# Patient Record
Sex: FEMALE | Race: WHITE
[De-identification: names, ages, dates, MRNs, and addresses within clinical notes are randomized per-mention and may not be internally consistent; named-entity substitution may affect disease eponyms.]

---

## 2024-05-02 ENCOUNTER — APPOINTMENT (OUTPATIENT)
Dept: FAMILY MEDICINE | Facility: CLINIC | Age: 29
End: 2024-05-02
Payer: COMMERCIAL

## 2024-05-02 ENCOUNTER — NON-APPOINTMENT (OUTPATIENT)
Age: 29
End: 2024-05-02

## 2024-05-02 VITALS
OXYGEN SATURATION: 98 % | DIASTOLIC BLOOD PRESSURE: 67 MMHG | HEIGHT: 69 IN | HEART RATE: 64 BPM | TEMPERATURE: 98.9 F | WEIGHT: 147 LBS | RESPIRATION RATE: 14 BRPM | SYSTOLIC BLOOD PRESSURE: 100 MMHG | BODY MASS INDEX: 21.77 KG/M2

## 2024-05-02 DIAGNOSIS — Z82.61 FAMILY HISTORY OF ARTHRITIS: ICD-10-CM

## 2024-05-02 DIAGNOSIS — Z13.1 ENCOUNTER FOR SCREENING FOR DIABETES MELLITUS: ICD-10-CM

## 2024-05-02 DIAGNOSIS — Z13.29 ENCOUNTER FOR SCREENING FOR OTHER SUSPECTED ENDOCRINE DISORDER: ICD-10-CM

## 2024-05-02 DIAGNOSIS — F32.A DEPRESSION, UNSPECIFIED: ICD-10-CM

## 2024-05-02 DIAGNOSIS — A60.00 HERPESVIRAL INFECTION OF UROGENITAL SYSTEM, UNSPECIFIED: ICD-10-CM

## 2024-05-02 DIAGNOSIS — J45.20 MILD INTERMITTENT ASTHMA, UNCOMPLICATED: ICD-10-CM

## 2024-05-02 DIAGNOSIS — F98.8 OTHER SPECIFIED BEHAVIORAL AND EMOTIONAL DISORDERS WITH ONSET USUALLY OCCURRING IN CHILDHOOD AND ADOLESCENCE: ICD-10-CM

## 2024-05-02 DIAGNOSIS — L40.9 PSORIASIS, UNSPECIFIED: ICD-10-CM

## 2024-05-02 DIAGNOSIS — Z87.891 PERSONAL HISTORY OF NICOTINE DEPENDENCE: ICD-10-CM

## 2024-05-02 DIAGNOSIS — Z82.0 FAMILY HISTORY OF EPILEPSY AND OTHER DISEASES OF THE NERVOUS SYSTEM: ICD-10-CM

## 2024-05-02 DIAGNOSIS — J30.2 OTHER SEASONAL ALLERGIC RHINITIS: ICD-10-CM

## 2024-05-02 DIAGNOSIS — N63.0 UNSPECIFIED LUMP IN UNSPECIFIED BREAST: ICD-10-CM

## 2024-05-02 DIAGNOSIS — Z00.00 ENCOUNTER FOR GENERAL ADULT MEDICAL EXAMINATION W/OUT ABNORMAL FINDINGS: ICD-10-CM

## 2024-05-02 DIAGNOSIS — Z78.9 OTHER SPECIFIED HEALTH STATUS: ICD-10-CM

## 2024-05-02 PROCEDURE — 99385 PREV VISIT NEW AGE 18-39: CPT

## 2024-05-02 PROCEDURE — 96127 BRIEF EMOTIONAL/BEHAV ASSMT: CPT

## 2024-05-02 RX ORDER — ALBUTEROL SULFATE 90 UG/1
108 (90 BASE) INHALANT RESPIRATORY (INHALATION)
Qty: 1 | Refills: 3 | Status: ACTIVE | COMMUNITY
Start: 2024-05-02 | End: 1900-01-01

## 2024-05-02 RX ORDER — FLUTICASONE PROPIONATE 50 UG/1
50 SPRAY, METERED NASAL
Qty: 1 | Refills: 0 | Status: ACTIVE | COMMUNITY
Start: 2024-05-02 | End: 1900-01-01

## 2024-05-02 RX ORDER — CETIRIZINE HYDROCHLORIDE 10 MG/1
10 TABLET, FILM COATED ORAL DAILY
Qty: 30 | Refills: 3 | Status: ACTIVE | COMMUNITY
Start: 2024-05-02 | End: 1900-01-01

## 2024-05-02 RX ORDER — VALACYCLOVIR 500 MG/1
500 TABLET, FILM COATED ORAL TWICE DAILY
Qty: 6 | Refills: 3 | Status: ACTIVE | COMMUNITY
Start: 2024-05-02 | End: 1900-01-01

## 2024-05-02 RX ORDER — AZELASTINE HYDROCHLORIDE 137 UG/1
137 SPRAY, METERED NASAL
Qty: 1 | Refills: 3 | Status: ACTIVE | COMMUNITY
Start: 2024-05-02 | End: 1900-01-01

## 2024-05-02 NOTE — HEALTH RISK ASSESSMENT
[Monthly or less (1 pt)] : Monthly or less (1 point) [1 or 2 (0 pts)] : 1 or 2 (0 points) [Never (0 pts)] : Never (0 points) [Yes] : In the past 12 months have you used drugs other than those required for medical reasons? Yes [Several Days (1)] : 5.) Poor appetite or overeating? Several days [Nearly Every Day (3)] : 7.) Trouble concentrating on things, such as reading a newspaper or watching television? Nearly every day [Not at All (0)] : 9.) Thoughts that you would be off dead or of hurting yourself in some way? Not at all [Mild] : Severity of Depression is Mild [Somewhat Difficult] : How difficult have these problems made it for you to do your work, take care of things at home, or get along with people? Somewhat difficult [PHQ-9 Positive] : PHQ-9 Positive [I have developed a follow-up plan documented below in the note.] : I have developed a follow-up plan documented below in the note. [Patient reported PAP Smear was normal] : Patient reported PAP Smear was normal [HIV Test offered] : HIV Test offered [Hepatitis C test offered] : Hepatitis C test offered [Former] : Former [0-4] : 0-4 [< 15 Years] : < 15 Years [Audit-CScore] : 1 [de-identified] : mushrooms [[id number]] : 7 [PapSmearDate] : 05/22 [de-identified] : 1 cig/day for 3 years, quit 2023

## 2024-05-02 NOTE — REVIEW OF SYSTEMS
[Sore Throat] : no sore throat [Suicidal] : not suicidal [Anxiety] : no anxiety [Depression] : depression [Negative] : Genitourinary [FreeTextEntry4] : itchy throat and sometimes coughs up tonsil stones

## 2024-05-02 NOTE — HISTORY OF PRESENT ILLNESS
[FreeTextEntry1] : est care and get CPE [de-identified] : Pt comes in to est care and get CPE. Pt moved here from [place] 1 month ago.  Has only had 1 HPV vaccine in past, would be interested in completing series. Hasn't had Pap in years. Had breast pain in past, former physician found breast lump for which pt was getting breast US every 6 months. She stopped going years ago. Has had nipple discharge for years, no recent change, no blood in it.   Asthma: uses albuterol inhaler before exercise. Hasn't had any exacerbations anytime recently.  Pt has hx of major depressive disorder and ADD. In the past she was on ritalin and xanax, she stopped it at the time bc her psychiatrist passed away and then it was hard to get refills. She was then restarted on ritalin, that made symptoms worse so she stopped taking it about 1 year ago. Not currently following with therapist or psychiatrist, but would be interested in following with therapist.

## 2024-05-02 NOTE — ASSESSMENT
[FreeTextEntry1] : # HM f/u AV labs Advised patient to look into insurance coverage of gardasil vaccine since she is past the upper limit age range  # STD screen f/u labs  # Mass of breast, overdue for cervical ca screen Referred to GYN  # Genital herpes Renewed valtrex script  # Mild asthma Sent in albuterol inhaler  # Mild depression/ADHD Pt was on ritalin and xanax in past Not currently on medications, but last time she took ritalin about 1 year ago she felt worse so stopped Referred to psychiatry Dr. Webber  # Scalp psoriasis Following w/ dermatologist  f/u in 1 year or PRN

## 2024-05-02 NOTE — PHYSICAL EXAM
[Normal TMs] : both tympanic membranes were normal [Supple] : supple [No Edema] : there was no peripheral edema [No Extremity Clubbing/Cyanosis] : no extremity clubbing/cyanosis [Soft] : abdomen soft [Non Tender] : non-tender [Non-distended] : non-distended [No Masses] : no abdominal mass palpated [Normal Supraclavicular Nodes] : no supraclavicular lymphadenopathy [Normal Gait] : normal gait [Speech Grossly Normal] : speech grossly normal [Normal Mood] : the mood was normal [Normal] : affect was normal and insight and judgment were intact [de-identified] : L palatal arch with 2 adjacent nodules vs blisters [de-identified] : L palpable 1 cm LN in cervical chain, non-tender

## 2024-05-08 ENCOUNTER — LABORATORY RESULT (OUTPATIENT)
Age: 29
End: 2024-05-08

## 2024-05-08 LAB
ALBUMIN SERPL ELPH-MCNC: 4.3 G/DL
ALP BLD-CCNC: 52 U/L
ALT SERPL-CCNC: 13 U/L
ANION GAP SERPL CALC-SCNC: 10 MMOL/L
AST SERPL-CCNC: 17 U/L
BILIRUB SERPL-MCNC: 0.6 MG/DL
BUN SERPL-MCNC: 9 MG/DL
CALCIUM SERPL-MCNC: 9.2 MG/DL
CHLORIDE SERPL-SCNC: 104 MMOL/L
CHOLEST SERPL-MCNC: 147 MG/DL
CO2 SERPL-SCNC: 27 MMOL/L
CREAT SERPL-MCNC: 0.69 MG/DL
EGFR: 121 ML/MIN/1.73M2
GLUCOSE SERPL-MCNC: 88 MG/DL
HDLC SERPL-MCNC: 83 MG/DL
LDLC SERPL CALC-MCNC: 56 MG/DL
NONHDLC SERPL-MCNC: 64 MG/DL
POTASSIUM SERPL-SCNC: 4.2 MMOL/L
PROT SERPL-MCNC: 6.4 G/DL
SODIUM SERPL-SCNC: 141 MMOL/L
TRIGL SERPL-MCNC: 31 MG/DL
TSH SERPL-ACNC: 1.92 UIU/ML

## 2024-05-09 LAB
C TRACH RRNA SPEC QL NAA+PROBE: NOT DETECTED
ESTIMATED AVERAGE GLUCOSE: 100 MG/DL
HAV IGM SER QL: NONREACTIVE
HBA1C MFR BLD HPLC: 5.1 %
HBV CORE IGM SER QL: NONREACTIVE
HBV SURFACE AG SER QL: NONREACTIVE
HCV AB SER QL: NONREACTIVE
HCV S/CO RATIO: 0.1 S/CO
HIV1+2 AB SPEC QL IA.RAPID: NONREACTIVE
N GONORRHOEA RRNA SPEC QL NAA+PROBE: NOT DETECTED
SOURCE AMPLIFICATION: NORMAL
T PALLIDUM AB SER QL IA: NEGATIVE

## 2024-05-10 ENCOUNTER — NON-APPOINTMENT (OUTPATIENT)
Age: 29
End: 2024-05-10

## 2024-05-13 ENCOUNTER — APPOINTMENT (OUTPATIENT)
Dept: OBGYN | Facility: CLINIC | Age: 29
End: 2024-05-13
Payer: COMMERCIAL

## 2024-05-13 VITALS
WEIGHT: 150 LBS | HEART RATE: 62 BPM | OXYGEN SATURATION: 96 % | BODY MASS INDEX: 22.22 KG/M2 | TEMPERATURE: 98.2 F | SYSTOLIC BLOOD PRESSURE: 90 MMHG | DIASTOLIC BLOOD PRESSURE: 70 MMHG | HEIGHT: 69 IN

## 2024-05-13 DIAGNOSIS — Z01.419 ENCOUNTER FOR GYNECOLOGICAL EXAMINATION (GENERAL) (ROUTINE) W/OUT ABNORMAL FINDINGS: ICD-10-CM

## 2024-05-13 DIAGNOSIS — Z86.19 PERSONAL HISTORY OF OTHER INFECTIOUS AND PARASITIC DISEASES: ICD-10-CM

## 2024-05-13 DIAGNOSIS — Z12.4 ENCOUNTER FOR SCREENING FOR MALIGNANT NEOPLASM OF CERVIX: ICD-10-CM

## 2024-05-13 DIAGNOSIS — Z78.9 OTHER SPECIFIED HEALTH STATUS: ICD-10-CM

## 2024-05-13 DIAGNOSIS — Z83.49 FAMILY HISTORY OF OTHER ENDOCRINE, NUTRITIONAL AND METABOLIC DISEASES: ICD-10-CM

## 2024-05-13 DIAGNOSIS — Z23 ENCOUNTER FOR IMMUNIZATION: ICD-10-CM

## 2024-05-13 DIAGNOSIS — N93.9 ABNORMAL UTERINE AND VAGINAL BLEEDING, UNSPECIFIED: ICD-10-CM

## 2024-05-13 DIAGNOSIS — Z11.3 ENCOUNTER FOR SCREENING FOR INFECTIONS WITH A PREDOMINANTLY SEXUAL MODE OF TRANSMISSION: ICD-10-CM

## 2024-05-13 PROCEDURE — 81025 URINE PREGNANCY TEST: CPT

## 2024-05-13 PROCEDURE — 99385 PREV VISIT NEW AGE 18-39: CPT

## 2024-05-13 PROCEDURE — 99213 OFFICE O/P EST LOW 20 MIN: CPT | Mod: 25

## 2024-05-13 NOTE — HISTORY OF PRESENT ILLNESS
[Normal Amount/Duration] :  normal amount and duration [Menarche Age: ____] : age at menarche was [unfilled] [No] : Patient does not have concerns regarding sex [Currently Active] : currently active [FreeTextEntry1] : 30 yo  LMP 24 presents for annual and  evaluation of an episode of abnormal vaginal bleeding

## 2024-05-13 NOTE — COUNSELING
[Nutrition/ Exercise/ Weight Management] : nutrition, exercise, weight management [Vitamins/Supplements] : vitamins/supplements [Breast Self Exam] : breast self exam [Bladder Hygiene] : bladder hygiene [Contraception/ Emergency Contraception/ Safe Sexual Practices] : contraception, emergency contraception, safe sexual practices [HPV Vaccine] : HPV Vaccine

## 2024-05-13 NOTE — PHYSICAL EXAM
[Chaperone Present] : A chaperone was present in the examining room during all aspects of the physical examination [Appropriately responsive] : appropriately responsive [Alert] : alert [No Lymphadenopathy] : no lymphadenopathy [Soft] : soft [Non-tender] : non-tender [Non-distended] : non-distended [Oriented x3] : oriented x3 [Examination Of The Breasts] : a normal appearance [No Discharge] : no discharge [No Masses] : no breast masses were palpable [Labia Majora] : normal [Labia Minora] : normal [No Bleeding] : There was no active vaginal bleeding [Normal] : normal [Uterine Adnexae] : normal

## 2024-05-15 LAB
C TRACH RRNA SPEC QL NAA+PROBE: NOT DETECTED
CANDIDA VAG CYTO: NOT DETECTED
G VAGINALIS+PREV SP MTYP VAG QL MICRO: NOT DETECTED
HPV 16 E6+E7 MRNA CVX QL NAA+PROBE: NOT DETECTED
HPV18+45 E6+E7 MRNA CVX QL NAA+PROBE: NOT DETECTED
N GONORRHOEA RRNA SPEC QL NAA+PROBE: NOT DETECTED
SOURCE AMPLIFICATION: NORMAL
T VAGINALIS VAG QL WET PREP: NOT DETECTED

## 2024-05-20 LAB
APPEARANCE: CLEAR
BASOPHILS # BLD AUTO: 0.02 K/UL
BASOPHILS NFR BLD AUTO: 0.5 %
BILIRUBIN URINE: NEGATIVE
BLOOD URINE: ABNORMAL
COLOR: YELLOW
CYTOLOGY CVX/VAG DOC THIN PREP: NORMAL
EOSINOPHIL # BLD AUTO: 0.1 K/UL
EOSINOPHIL NFR BLD AUTO: 2.7 %
GLUCOSE QUALITATIVE U: NEGATIVE MG/DL
HCT VFR BLD CALC: 38.5 %
HGB BLD-MCNC: 12.9 G/DL
IMM GRANULOCYTES NFR BLD AUTO: 0.3 %
KETONES URINE: NEGATIVE MG/DL
LEUKOCYTE ESTERASE URINE: NEGATIVE
LYMPHOCYTES # BLD AUTO: 2.03 K/UL
LYMPHOCYTES NFR BLD AUTO: 54.6 %
MAN DIFF?: NORMAL
MCHC RBC-ENTMCNC: 29.5 PG
MCHC RBC-ENTMCNC: 33.5 GM/DL
MCV RBC AUTO: 87.9 FL
MONOCYTES # BLD AUTO: 0.36 K/UL
MONOCYTES NFR BLD AUTO: 9.7 %
NEUTROPHILS # BLD AUTO: 1.2 K/UL
NEUTROPHILS NFR BLD AUTO: 32.2 %
NITRITE URINE: NEGATIVE
PH URINE: 6
PLATELET # BLD AUTO: 304 K/UL
PROTEIN URINE: NEGATIVE MG/DL
RBC # BLD: 4.38 M/UL
RBC # FLD: 12.4 %
SPECIFIC GRAVITY URINE: 1.02
UROBILINOGEN URINE: 0.2 MG/DL
WBC # FLD AUTO: 3.72 K/UL

## 2024-05-21 LAB — HPV HIGH+LOW RISK DNA PNL CVX: NOT DETECTED

## 2024-06-13 ENCOUNTER — APPOINTMENT (OUTPATIENT)
Dept: FAMILY MEDICINE | Facility: CLINIC | Age: 29
End: 2024-06-13
Payer: COMMERCIAL

## 2024-06-13 VITALS
RESPIRATION RATE: 14 BRPM | TEMPERATURE: 97.8 F | OXYGEN SATURATION: 98 % | WEIGHT: 144 LBS | BODY MASS INDEX: 21.33 KG/M2 | SYSTOLIC BLOOD PRESSURE: 90 MMHG | DIASTOLIC BLOOD PRESSURE: 70 MMHG | HEIGHT: 69 IN | HEART RATE: 76 BPM

## 2024-06-13 DIAGNOSIS — J02.9 ACUTE PHARYNGITIS, UNSPECIFIED: ICD-10-CM

## 2024-06-13 PROCEDURE — 99213 OFFICE O/P EST LOW 20 MIN: CPT

## 2024-06-13 NOTE — PHYSICAL EXAM
[Normal Sclera/Conjunctiva] : normal sclera/conjunctiva [Normal Outer Ear/Nose] : the outer ears and nose were normal in appearance [Normal TMs] : both tympanic membranes were normal [No Lymphadenopathy] : no lymphadenopathy [Supple] : supple [Normal] : no respiratory distress, lungs were clear to auscultation bilaterally and no accessory muscle use [No Extremity Clubbing/Cyanosis] : no extremity clubbing/cyanosis [Normal Gait] : normal gait [Normal Affect] : the affect was normal [Alert and Oriented x3] : oriented to person, place, and time [Normal Insight/Judgement] : insight and judgment were intact [de-identified] : mildly erythematous oropharynx, no exudates. L ear canal slightly erythematous

## 2024-06-13 NOTE — REVIEW OF SYSTEMS
[Fever] : no fever [Chills] : no chills [Fatigue] : no fatigue [Earache] : no earache [Nasal Discharge] : nasal discharge [Sore Throat] : sore throat [Negative] : Respiratory

## 2024-06-13 NOTE — ASSESSMENT
[FreeTextEntry1] : # Viral pharyngitis f/u COVID, flu, RSV Symptomatic management only with dayquil, throat lozenges, etc  f/u PRN

## 2024-06-13 NOTE — HISTORY OF PRESENT ILLNESS
[FreeTextEntry8] : Pt comes in for sick visit. Woke up today with sore throat, runny nose. She touched a baby duck 2 weeks ago. Has also been [place] once weekly without a mask. No known sick contacts. No fever/chills, SOB, cough, earache. Took dayquil today, helped with symptoms.

## 2024-06-14 LAB
INFLUENZA A RESULT: NOT DETECTED
INFLUENZA B RESULT: NOT DETECTED
RESP SYN VIRUS RESULT: NOT DETECTED
SARS-COV-2 RESULT: NOT DETECTED

## 2024-08-19 ENCOUNTER — NON-APPOINTMENT (OUTPATIENT)
Age: 29
End: 2024-08-19

## 2024-08-21 ENCOUNTER — APPOINTMENT (OUTPATIENT)
Dept: OBGYN | Facility: CLINIC | Age: 29
End: 2024-08-21
Payer: COMMERCIAL

## 2024-08-21 VITALS
BODY MASS INDEX: 21.18 KG/M2 | WEIGHT: 143 LBS | DIASTOLIC BLOOD PRESSURE: 73 MMHG | SYSTOLIC BLOOD PRESSURE: 111 MMHG | HEIGHT: 69 IN | HEART RATE: 82 BPM

## 2024-08-21 DIAGNOSIS — N91.2 AMENORRHEA, UNSPECIFIED: ICD-10-CM

## 2024-08-21 LAB — HCG UR QL: POSITIVE

## 2024-08-21 PROCEDURE — 76830 TRANSVAGINAL US NON-OB: CPT

## 2024-08-21 PROCEDURE — 81025 URINE PREGNANCY TEST: CPT

## 2024-08-21 PROCEDURE — 99214 OFFICE O/P EST MOD 30 MIN: CPT | Mod: 25

## 2024-08-21 NOTE — PHYSICAL EXAM
[TextEntry] : Transvaginal ultrasound performed by me at bedside reveals a segovia viable intrauterine pregnancy with crown-rump length of 1.77 cm consistent with 8 weeks 2 days.  Subchorionic hematoma known superiorly above gestational sac not involving gestational sac measuring 1.56 x 1.03 cm.  Normal adnexa bilaterally, no free fluid.

## 2024-08-21 NOTE — DISCUSSION/SUMMARY
[FreeTextEntry1] : 29-year-old G1, P0 at 8 weeks 2 days based on my bedside ultrasound, unclear LMP, RED[facility] March 31, 2025.   Amenorrhea: Oriented to  and call coverage, booklet given, Rx blood work, reviewed NIPT and genetic testing.  Reviewed will need official sonogram for dating since patient is unsure of her LMP.  Reviewed NT and NIPT in 4 weeks  Subchorionic hematoma: Patient counseled, bleeding precautions, pelvic rest

## 2024-08-21 NOTE — HISTORY OF PRESENT ILLNESS
[FreeTextEntry1] : This is a 29-year-old G1, P0 with LMP of May 8 who presents for initial visit, pregnancy confirmation  She is here with her partner Phil.  She states that her flow lorene states she is 8 weeks pregnant however LMP of May 8 would make her 15 weeks 0 days.  Patient denies vaginal bleeding, is feeling well   - History of Present Illness (HPI) : Leeanna is unsure of her exact gestational age due to conflicting information from her LMP date and a pregnancy lorene. She denies any bleeding, cramping, or other concerning symptoms. Past GYN: History of genital herpes, last outbreak last year.  Denies history of abnormal Paps/gonorrhea/chlamydia - Past Medical History : No significant past medical history reported. - Past Surgical History : No previous surgeries reported. - Family History : Father with rheumatoid arthritis. Mother with hypothyroidism. - Social History : Works as a  for a . No current smoking, vaping, or substance use. Previous history of vaping and marijuana use, but has quit.  Had a full Pap and annual and STD panel with Dr. Simmons.  STD panel was negative. [PapSmeardate] : 5/2024 [TextBox_31] : neg/neg [PGxTotal] : 1

## 2024-09-03 ENCOUNTER — TRANSCRIPTION ENCOUNTER (OUTPATIENT)
Age: 29
End: 2024-09-03

## 2024-09-04 ENCOUNTER — ASOB RESULT (OUTPATIENT)
Age: 29
End: 2024-09-04

## 2024-09-04 ENCOUNTER — APPOINTMENT (OUTPATIENT)
Dept: ANTEPARTUM | Facility: CLINIC | Age: 29
End: 2024-09-04
Payer: COMMERCIAL

## 2024-09-04 PROCEDURE — 76817 TRANSVAGINAL US OBSTETRIC: CPT

## 2024-09-04 PROCEDURE — 76801 OB US < 14 WKS SINGLE FETUS: CPT

## 2024-09-09 ENCOUNTER — APPOINTMENT (OUTPATIENT)
Dept: OBGYN | Facility: CLINIC | Age: 29
End: 2024-09-09
Payer: COMMERCIAL

## 2024-09-09 VITALS
BODY MASS INDEX: 22.66 KG/M2 | SYSTOLIC BLOOD PRESSURE: 100 MMHG | WEIGHT: 153 LBS | HEIGHT: 69 IN | DIASTOLIC BLOOD PRESSURE: 60 MMHG

## 2024-09-09 DIAGNOSIS — Z34.90 ENCOUNTER FOR SUPERVISION OF NORMAL PREGNANCY, UNSPECIFIED, UNSPECIFIED TRIMESTER: ICD-10-CM

## 2024-09-09 LAB
BILIRUB UR QL STRIP: NORMAL
CLARITY UR: CLEAR
COLLECTION METHOD: NORMAL
GLUCOSE UR-MCNC: NORMAL
HCG UR QL: 0.2 EU/DL
HGB UR QL STRIP.AUTO: NORMAL
KETONES UR-MCNC: NORMAL
LEUKOCYTE ESTERASE UR QL STRIP: NORMAL
NITRITE UR QL STRIP: NORMAL
PH UR STRIP: 5.5
PROT UR STRIP-MCNC: NORMAL
SP GR UR STRIP: >=1.03

## 2024-09-09 PROCEDURE — 81003 URINALYSIS AUTO W/O SCOPE: CPT | Mod: NC,QW

## 2024-09-09 PROCEDURE — 0501F PRENATAL FLOW SHEET: CPT

## 2024-09-09 PROCEDURE — 36415 COLL VENOUS BLD VENIPUNCTURE: CPT

## 2024-09-19 ENCOUNTER — NON-APPOINTMENT (OUTPATIENT)
Age: 29
End: 2024-09-19

## 2024-09-20 ENCOUNTER — APPOINTMENT (OUTPATIENT)
Dept: ANTEPARTUM | Facility: CLINIC | Age: 29
End: 2024-09-20
Payer: COMMERCIAL

## 2024-09-20 ENCOUNTER — APPOINTMENT (OUTPATIENT)
Dept: OBGYN | Facility: CLINIC | Age: 29
End: 2024-09-20
Payer: COMMERCIAL

## 2024-09-20 ENCOUNTER — ASOB RESULT (OUTPATIENT)
Age: 29
End: 2024-09-20

## 2024-09-20 VITALS
DIASTOLIC BLOOD PRESSURE: 72 MMHG | OXYGEN SATURATION: 100 % | BODY MASS INDEX: 22.66 KG/M2 | HEIGHT: 69 IN | SYSTOLIC BLOOD PRESSURE: 101 MMHG | WEIGHT: 153 LBS | HEART RATE: 72 BPM

## 2024-09-20 DIAGNOSIS — Z34.91 ENCOUNTER FOR SUPERVISION OF NORMAL PREGNANCY, UNSPECIFIED, FIRST TRIMESTER: ICD-10-CM

## 2024-09-20 PROCEDURE — 76813 OB US NUCHAL MEAS 1 GEST: CPT

## 2024-09-20 PROCEDURE — 0502F SUBSEQUENT PRENATAL CARE: CPT

## 2024-09-20 PROCEDURE — 81003 URINALYSIS AUTO W/O SCOPE: CPT | Mod: NC,QW

## 2024-09-20 PROCEDURE — 90471 IMMUNIZATION ADMIN: CPT

## 2024-09-20 PROCEDURE — 90656 IIV3 VACC NO PRSV 0.5 ML IM: CPT

## 2024-09-20 PROCEDURE — 36415 COLL VENOUS BLD VENIPUNCTURE: CPT

## 2024-09-22 LAB
BILIRUB UR QL STRIP: NEGATIVE
CLARITY UR: CLEAR
COLLECTION METHOD: NORMAL
GLUCOSE UR-MCNC: NEGATIVE
HCG UR QL: 0.2 EU/DL
HGB UR QL STRIP.AUTO: NEGATIVE
KETONES UR-MCNC: NEGATIVE
LEUKOCYTE ESTERASE UR QL STRIP: NEGATIVE
NITRITE UR QL STRIP: NEGATIVE
PH UR STRIP: 6
PROT UR STRIP-MCNC: NEGATIVE
SP GR UR STRIP: 1.01

## 2024-10-02 ENCOUNTER — APPOINTMENT (OUTPATIENT)
Dept: ULTRASOUND IMAGING | Facility: CLINIC | Age: 29
End: 2024-10-02
Payer: COMMERCIAL

## 2024-10-02 ENCOUNTER — RESULT REVIEW (OUTPATIENT)
Age: 29
End: 2024-10-02

## 2024-10-02 ENCOUNTER — OUTPATIENT (OUTPATIENT)
Dept: OUTPATIENT SERVICES | Facility: HOSPITAL | Age: 29
LOS: 1 days | End: 2024-10-02
Payer: COMMERCIAL

## 2024-10-02 DIAGNOSIS — N63.0 UNSPECIFIED LUMP IN UNSPECIFIED BREAST: ICD-10-CM

## 2024-10-02 PROCEDURE — 76641 ULTRASOUND BREAST COMPLETE: CPT | Mod: 26,50

## 2024-10-02 PROCEDURE — 76641 ULTRASOUND BREAST COMPLETE: CPT

## 2024-10-03 ENCOUNTER — TRANSCRIPTION ENCOUNTER (OUTPATIENT)
Age: 29
End: 2024-10-03

## 2024-10-15 ENCOUNTER — APPOINTMENT (OUTPATIENT)
Dept: BREAST CENTER | Facility: CLINIC | Age: 29
End: 2024-10-15
Payer: COMMERCIAL

## 2024-10-15 VITALS
WEIGHT: 153 LBS | DIASTOLIC BLOOD PRESSURE: 65 MMHG | HEIGHT: 69 IN | HEART RATE: 88 BPM | BODY MASS INDEX: 22.66 KG/M2 | SYSTOLIC BLOOD PRESSURE: 99 MMHG

## 2024-10-15 DIAGNOSIS — Z80.9 FAMILY HISTORY OF MALIGNANT NEOPLASM, UNSPECIFIED: ICD-10-CM

## 2024-10-15 DIAGNOSIS — N64.52 NIPPLE DISCHARGE: ICD-10-CM

## 2024-10-15 DIAGNOSIS — N63.20 UNSPECIFIED LUMP IN THE LEFT BREAST, UNSPECIFIED QUADRANT: ICD-10-CM

## 2024-10-15 DIAGNOSIS — N64.4 MASTODYNIA: ICD-10-CM

## 2024-10-15 DIAGNOSIS — R92.8 OTHER ABNORMAL AND INCONCLUSIVE FINDINGS ON DIAGNOSTIC IMAGING OF BREAST: ICD-10-CM

## 2024-10-15 PROCEDURE — 99204 OFFICE O/P NEW MOD 45 MIN: CPT

## 2024-10-15 RX ORDER — MAGNESIUM OXIDE/MAG AA CHELATE 300 MG
CAPSULE ORAL
Refills: 0 | Status: ACTIVE | COMMUNITY

## 2024-10-15 RX ORDER — CALCIUM CARBONATE/VITAMIN D3 600 MG-10
TABLET ORAL
Refills: 0 | Status: ACTIVE | COMMUNITY

## 2024-10-15 RX ORDER — FOLIC ACID/MULTIVIT,IRON,MINER 0.4MG-18MG
TABLET ORAL
Refills: 0 | Status: ACTIVE | COMMUNITY

## 2024-10-17 ENCOUNTER — RESULT REVIEW (OUTPATIENT)
Age: 29
End: 2024-10-17

## 2024-10-17 ENCOUNTER — APPOINTMENT (OUTPATIENT)
Dept: ULTRASOUND IMAGING | Facility: CLINIC | Age: 29
End: 2024-10-17
Payer: COMMERCIAL

## 2024-10-17 ENCOUNTER — OUTPATIENT (OUTPATIENT)
Dept: OUTPATIENT SERVICES | Facility: HOSPITAL | Age: 29
LOS: 1 days | End: 2024-10-17
Payer: COMMERCIAL

## 2024-10-17 DIAGNOSIS — N63.20 UNSPECIFIED LUMP IN THE LEFT BREAST, UNSPECIFIED QUADRANT: ICD-10-CM

## 2024-10-17 DIAGNOSIS — Z00.8 ENCOUNTER FOR OTHER GENERAL EXAMINATION: ICD-10-CM

## 2024-10-17 DIAGNOSIS — R92.8 OTHER ABNORMAL AND INCONCLUSIVE FINDINGS ON DIAGNOSTIC IMAGING OF BREAST: ICD-10-CM

## 2024-10-17 PROCEDURE — 88305 TISSUE EXAM BY PATHOLOGIST: CPT

## 2024-10-17 PROCEDURE — 19083 BX BREAST 1ST LESION US IMAG: CPT | Mod: LT

## 2024-10-17 PROCEDURE — A4648: CPT

## 2024-10-17 PROCEDURE — 19083 BX BREAST 1ST LESION US IMAG: CPT

## 2024-10-17 PROCEDURE — 88305 TISSUE EXAM BY PATHOLOGIST: CPT | Mod: 26

## 2024-10-24 ENCOUNTER — NON-APPOINTMENT (OUTPATIENT)
Age: 29
End: 2024-10-24

## 2024-10-24 ENCOUNTER — LABORATORY RESULT (OUTPATIENT)
Age: 29
End: 2024-10-24

## 2024-10-24 ENCOUNTER — APPOINTMENT (OUTPATIENT)
Dept: OBGYN | Facility: CLINIC | Age: 29
End: 2024-10-24
Payer: COMMERCIAL

## 2024-10-24 VITALS
SYSTOLIC BLOOD PRESSURE: 108 MMHG | DIASTOLIC BLOOD PRESSURE: 60 MMHG | HEIGHT: 69 IN | WEIGHT: 161 LBS | BODY MASS INDEX: 23.85 KG/M2

## 2024-10-24 DIAGNOSIS — Z34.00 ENCOUNTER FOR SUPERVISION OF NORMAL FIRST PREGNANCY, UNSPECIFIED TRIMESTER: ICD-10-CM

## 2024-10-24 LAB
BILIRUB UR QL STRIP: NORMAL
CLARITY UR: CLEAR
COLLECTION METHOD: NORMAL
GLUCOSE UR-MCNC: NORMAL
HCG UR QL: 0.2 EU/DL
HGB UR QL STRIP.AUTO: NORMAL
KETONES UR-MCNC: NORMAL
LEUKOCYTE ESTERASE UR QL STRIP: NORMAL
NITRITE UR QL STRIP: NORMAL
PH UR STRIP: 6
PROT UR STRIP-MCNC: NORMAL
SP GR UR STRIP: 1.02

## 2024-10-24 PROCEDURE — 36415 COLL VENOUS BLD VENIPUNCTURE: CPT

## 2024-10-24 PROCEDURE — 0502F SUBSEQUENT PRENATAL CARE: CPT

## 2024-10-24 PROCEDURE — 81003 URINALYSIS AUTO W/O SCOPE: CPT | Mod: NC,QW

## 2024-10-25 LAB — TSH SERPL-ACNC: 0.76 UIU/ML

## 2024-11-05 ENCOUNTER — NON-APPOINTMENT (OUTPATIENT)
Age: 29
End: 2024-11-05

## 2024-11-06 ENCOUNTER — APPOINTMENT (OUTPATIENT)
Dept: OBGYN | Facility: CLINIC | Age: 29
End: 2024-11-06

## 2024-11-07 ENCOUNTER — ASOB RESULT (OUTPATIENT)
Age: 29
End: 2024-11-07

## 2024-11-07 ENCOUNTER — APPOINTMENT (OUTPATIENT)
Dept: ANTEPARTUM | Facility: CLINIC | Age: 29
End: 2024-11-07
Payer: COMMERCIAL

## 2024-11-07 PROCEDURE — 76811 OB US DETAILED SNGL FETUS: CPT

## 2024-11-07 PROCEDURE — 76817 TRANSVAGINAL US OBSTETRIC: CPT

## 2024-11-12 ENCOUNTER — NON-APPOINTMENT (OUTPATIENT)
Age: 29
End: 2024-11-12

## 2024-11-14 ENCOUNTER — APPOINTMENT (OUTPATIENT)
Dept: OBGYN | Facility: CLINIC | Age: 29
End: 2024-11-14

## 2024-12-17 ENCOUNTER — TRANSCRIPTION ENCOUNTER (OUTPATIENT)
Age: 29
End: 2024-12-17

## 2024-12-23 ENCOUNTER — NON-APPOINTMENT (OUTPATIENT)
Age: 29
End: 2024-12-23

## 2024-12-23 ENCOUNTER — APPOINTMENT (OUTPATIENT)
Dept: OBGYN | Facility: CLINIC | Age: 29
End: 2024-12-23
Payer: COMMERCIAL

## 2024-12-23 VITALS
HEIGHT: 69 IN | WEIGHT: 178 LBS | DIASTOLIC BLOOD PRESSURE: 70 MMHG | SYSTOLIC BLOOD PRESSURE: 110 MMHG | BODY MASS INDEX: 26.36 KG/M2

## 2024-12-23 LAB
BILIRUB UR QL STRIP: NEGATIVE
CLARITY UR: CLEAR
COLLECTION METHOD: NORMAL
GLUCOSE UR-MCNC: NEGATIVE
HCG UR QL: 0.2 EU/DL
HGB UR QL STRIP.AUTO: NEGATIVE
KETONES UR-MCNC: NEGATIVE
LEUKOCYTE ESTERASE UR QL STRIP: NEGATIVE
NITRITE UR QL STRIP: NEGATIVE
PH UR STRIP: 7
PROT UR STRIP-MCNC: NEGATIVE
SP GR UR STRIP: 1.01

## 2024-12-23 PROCEDURE — 81003 URINALYSIS AUTO W/O SCOPE: CPT | Mod: NC,QW

## 2024-12-23 PROCEDURE — 0502F SUBSEQUENT PRENATAL CARE: CPT

## 2024-12-26 LAB
AMPHET UR-MCNC: NEGATIVE NG/ML
BARBITURATES UR-MCNC: NEGATIVE NG/ML
BENZODIAZ UR-MCNC: NEGATIVE NG/ML
COCAINE METAB.OTHER UR-MCNC: NEGATIVE NG/ML
CREATININE, URINE: 16 MG/DL
FENTANYL, URINE: NEGATIVE NG/ML
METHADONE SCREEN, UR: NEGATIVE NG/ML
OPIATES UR-MCNC: NEGATIVE NG/ML
OXYCODONE/OXYMORPHONE, URINE: NEGATIVE NG/ML
PCP UR-MCNC: NEGATIVE NG/ML
PH, URINE: 6.8
THC UR QL: NEGATIVE NG/ML

## 2025-01-02 ENCOUNTER — APPOINTMENT (OUTPATIENT)
Dept: ANTEPARTUM | Facility: CLINIC | Age: 30
End: 2025-01-02
Payer: COMMERCIAL

## 2025-01-02 ENCOUNTER — ASOB RESULT (OUTPATIENT)
Age: 30
End: 2025-01-02

## 2025-01-02 PROCEDURE — 76816 OB US FOLLOW-UP PER FETUS: CPT

## 2025-01-05 LAB
BASOPHILS # BLD AUTO: 0.03 K/UL
BASOPHILS NFR BLD AUTO: 0.3 %
EOSINOPHIL # BLD AUTO: 0.08 K/UL
EOSINOPHIL NFR BLD AUTO: 0.8 %
GLUCOSE 1H P 50 G GLC PO SERPL-MCNC: 114 MG/DL
GLUCOSE BS SERPL-MCNC: 77 MG/DL
HCT VFR BLD CALC: 35.9 %
HGB BLD-MCNC: 11.4 G/DL
IMM GRANULOCYTES NFR BLD AUTO: 0.4 %
LYMPHOCYTES # BLD AUTO: 1.91 K/UL
LYMPHOCYTES NFR BLD AUTO: 20.1 %
MAN DIFF?: NORMAL
MCHC RBC-ENTMCNC: 30.2 PG
MCHC RBC-ENTMCNC: 31.8 G/DL
MCV RBC AUTO: 95.2 FL
MONOCYTES # BLD AUTO: 0.63 K/UL
MONOCYTES NFR BLD AUTO: 6.6 %
NEUTROPHILS # BLD AUTO: 6.82 K/UL
NEUTROPHILS NFR BLD AUTO: 71.8 %
PLATELET # BLD AUTO: 300 K/UL
RBC # BLD: 3.77 M/UL
RBC # FLD: 12.3 %
TSH SERPL-ACNC: 0.88 UIU/ML
WBC # FLD AUTO: 9.51 K/UL

## 2025-01-13 ENCOUNTER — NON-APPOINTMENT (OUTPATIENT)
Age: 30
End: 2025-01-13

## 2025-01-13 DIAGNOSIS — R10.2 OTHER SPECIFIED PREGNANCY RELATED CONDITIONS, UNSPECIFIED TRIMESTER: ICD-10-CM

## 2025-01-13 DIAGNOSIS — O26.899 OTHER SPECIFIED PREGNANCY RELATED CONDITIONS, UNSPECIFIED TRIMESTER: ICD-10-CM

## 2025-01-31 ENCOUNTER — NON-APPOINTMENT (OUTPATIENT)
Age: 30
End: 2025-01-31

## 2025-02-03 ENCOUNTER — APPOINTMENT (OUTPATIENT)
Dept: ANTEPARTUM | Facility: CLINIC | Age: 30
End: 2025-02-03
Payer: COMMERCIAL

## 2025-02-03 ENCOUNTER — APPOINTMENT (OUTPATIENT)
Dept: OBGYN | Facility: CLINIC | Age: 30
End: 2025-02-03

## 2025-02-03 ENCOUNTER — ASOB RESULT (OUTPATIENT)
Age: 30
End: 2025-02-03

## 2025-02-03 LAB
BILIRUB UR QL STRIP: NORMAL
CLARITY UR: CLEAR
COLLECTION METHOD: NORMAL
GLUCOSE UR-MCNC: NORMAL
HCG UR QL: 0.2 EU/DL
HGB UR QL STRIP.AUTO: NORMAL
KETONES UR-MCNC: NORMAL
LEUKOCYTE ESTERASE UR QL STRIP: NORMAL
NITRITE UR QL STRIP: NORMAL
PH UR STRIP: 6
PROT UR STRIP-MCNC: NORMAL
SP GR UR STRIP: 1.01

## 2025-02-03 PROCEDURE — 90471 IMMUNIZATION ADMIN: CPT

## 2025-02-03 PROCEDURE — 76816 OB US FOLLOW-UP PER FETUS: CPT

## 2025-02-03 PROCEDURE — 0502F SUBSEQUENT PRENATAL CARE: CPT

## 2025-02-03 PROCEDURE — 76819 FETAL BIOPHYS PROFIL W/O NST: CPT

## 2025-02-03 PROCEDURE — 81003 URINALYSIS AUTO W/O SCOPE: CPT | Mod: QW

## 2025-02-03 PROCEDURE — 90715 TDAP VACCINE 7 YRS/> IM: CPT

## 2025-02-08 ENCOUNTER — EMERGENCY (EMERGENCY)
Facility: HOSPITAL | Age: 30
LOS: 1 days | Discharge: HOME | End: 2025-02-08
Attending: EMERGENCY MEDICINE
Payer: COMMERCIAL

## 2025-02-08 VITALS
HEART RATE: 84 BPM | WEIGHT: 199.96 LBS | HEIGHT: 70 IN | RESPIRATION RATE: 16 BRPM | SYSTOLIC BLOOD PRESSURE: 105 MMHG | TEMPERATURE: 98 F | DIASTOLIC BLOOD PRESSURE: 75 MMHG | OXYGEN SATURATION: 100 %

## 2025-02-08 VITALS
TEMPERATURE: 98 F | HEART RATE: 80 BPM | DIASTOLIC BLOOD PRESSURE: 67 MMHG | SYSTOLIC BLOOD PRESSURE: 100 MMHG | RESPIRATION RATE: 16 BRPM | OXYGEN SATURATION: 100 %

## 2025-02-08 LAB
ALBUMIN SERPL ELPH-MCNC: 3.4 G/DL — SIGNIFICANT CHANGE UP (ref 3.3–5)
ALP SERPL-CCNC: 66 U/L — SIGNIFICANT CHANGE UP (ref 40–120)
ALT FLD-CCNC: 14 U/L — SIGNIFICANT CHANGE UP (ref 10–45)
ANION GAP SERPL CALC-SCNC: 11 MMOL/L — SIGNIFICANT CHANGE UP (ref 5–17)
APPEARANCE UR: CLEAR — SIGNIFICANT CHANGE UP
AST SERPL-CCNC: 14 U/L — SIGNIFICANT CHANGE UP (ref 10–40)
BACTERIA # UR AUTO: NEGATIVE /HPF — SIGNIFICANT CHANGE UP
BILIRUB SERPL-MCNC: 0.2 MG/DL — SIGNIFICANT CHANGE UP (ref 0.2–1.2)
BILIRUB UR-MCNC: NEGATIVE — SIGNIFICANT CHANGE UP
BUN SERPL-MCNC: 7 MG/DL — SIGNIFICANT CHANGE UP (ref 7–23)
CALCIUM SERPL-MCNC: 9.5 MG/DL — SIGNIFICANT CHANGE UP (ref 8.4–10.5)
CAST: 0 /LPF — SIGNIFICANT CHANGE UP (ref 0–4)
CHLORIDE SERPL-SCNC: 104 MMOL/L — SIGNIFICANT CHANGE UP (ref 96–108)
CO2 SERPL-SCNC: 22 MMOL/L — SIGNIFICANT CHANGE UP (ref 22–31)
COLOR SPEC: YELLOW — SIGNIFICANT CHANGE UP
CREAT SERPL-MCNC: 0.53 MG/DL — SIGNIFICANT CHANGE UP (ref 0.5–1.3)
DIFF PNL FLD: NEGATIVE — SIGNIFICANT CHANGE UP
EGFR: 129 ML/MIN/1.73M2 — SIGNIFICANT CHANGE UP
FLUAV AG NPH QL: SIGNIFICANT CHANGE UP
FLUBV AG NPH QL: SIGNIFICANT CHANGE UP
GLUCOSE SERPL-MCNC: 98 MG/DL — SIGNIFICANT CHANGE UP (ref 70–99)
GLUCOSE UR QL: NEGATIVE MG/DL — SIGNIFICANT CHANGE UP
HCT VFR BLD CALC: 34.2 % — LOW (ref 34.5–45)
HGB BLD-MCNC: 11.3 G/DL — LOW (ref 11.5–15.5)
KETONES UR-MCNC: NEGATIVE MG/DL — SIGNIFICANT CHANGE UP
LEUKOCYTE ESTERASE UR-ACNC: NEGATIVE — SIGNIFICANT CHANGE UP
MCHC RBC-ENTMCNC: 30.8 PG — SIGNIFICANT CHANGE UP (ref 27–34)
MCHC RBC-ENTMCNC: 33 G/DL — SIGNIFICANT CHANGE UP (ref 32–36)
MCV RBC AUTO: 93.2 FL — SIGNIFICANT CHANGE UP (ref 80–100)
NITRITE UR-MCNC: NEGATIVE — SIGNIFICANT CHANGE UP
NRBC # BLD: 0 /100 WBCS — SIGNIFICANT CHANGE UP (ref 0–0)
NRBC BLD-RTO: 0 /100 WBCS — SIGNIFICANT CHANGE UP (ref 0–0)
PH UR: 7 — SIGNIFICANT CHANGE UP (ref 5–8)
PLATELET # BLD AUTO: 277 K/UL — SIGNIFICANT CHANGE UP (ref 150–400)
POTASSIUM SERPL-MCNC: 4 MMOL/L — SIGNIFICANT CHANGE UP (ref 3.5–5.3)
POTASSIUM SERPL-SCNC: 4 MMOL/L — SIGNIFICANT CHANGE UP (ref 3.5–5.3)
PROT SERPL-MCNC: 6 G/DL — SIGNIFICANT CHANGE UP (ref 6–8.3)
PROT UR-MCNC: NEGATIVE MG/DL — SIGNIFICANT CHANGE UP
RBC # BLD: 3.67 M/UL — LOW (ref 3.8–5.2)
RBC # FLD: 12.9 % — SIGNIFICANT CHANGE UP (ref 10.3–14.5)
RBC CASTS # UR COMP ASSIST: 0 /HPF — SIGNIFICANT CHANGE UP (ref 0–4)
RSV RNA NPH QL NAA+NON-PROBE: SIGNIFICANT CHANGE UP
SARS-COV-2 RNA SPEC QL NAA+PROBE: SIGNIFICANT CHANGE UP
SODIUM SERPL-SCNC: 137 MMOL/L — SIGNIFICANT CHANGE UP (ref 135–145)
SP GR SPEC: 1.01 — SIGNIFICANT CHANGE UP (ref 1–1.03)
SQUAMOUS # UR AUTO: 1 /HPF — SIGNIFICANT CHANGE UP (ref 0–5)
UROBILINOGEN FLD QL: 0.2 MG/DL — SIGNIFICANT CHANGE UP (ref 0.2–1)
WBC # BLD: 11 K/UL — HIGH (ref 3.8–10.5)
WBC # FLD AUTO: 11 K/UL — HIGH (ref 3.8–10.5)
WBC UR QL: 1 /HPF — SIGNIFICANT CHANGE UP (ref 0–5)

## 2025-02-08 PROCEDURE — 87637 SARSCOV2&INF A&B&RSV AMP PRB: CPT

## 2025-02-08 PROCEDURE — 85027 COMPLETE CBC AUTOMATED: CPT

## 2025-02-08 PROCEDURE — 99283 EMERGENCY DEPT VISIT LOW MDM: CPT | Mod: 25

## 2025-02-08 PROCEDURE — 99284 EMERGENCY DEPT VISIT MOD MDM: CPT

## 2025-02-08 PROCEDURE — 81001 URINALYSIS AUTO W/SCOPE: CPT

## 2025-02-08 PROCEDURE — 36000 PLACE NEEDLE IN VEIN: CPT

## 2025-02-08 PROCEDURE — 80053 COMPREHEN METABOLIC PANEL: CPT

## 2025-02-08 NOTE — ED PROVIDER NOTE - CLINICAL SUMMARY MEDICAL DECISION MAKING FREE TEXT BOX
29-year-old female G1, P0 at approximately 33 weeks based on ultrasound, LMP presenting with 1 episode of diarrhea earlier this afternoon. PatientLikely viral syndrome versus infectious diarrhea.  Otherwise HDS.  Labs, UA/urine culture, NST, FHR check.  Likely dispo to home with outpatient OB/GYN follow-up.

## 2025-02-08 NOTE — ED PROVIDER NOTE - ATTENDING CONTRIBUTION TO CARE
29-year-old female G1, P0 at approximately 33 weeks based on ultrasound, LMP presenting with 1 episode of diarrhea earlier this afternoon.   Patient otherwise asymptomatic at this time occurred about an hour prior to arrival she was more concerned about her pregnancy although she has no abdominal pain at this time no fevers chills well-appearing OB/GYN consulted as she is 33 weeks for monitoring purposes check basic labs no sick contacts, no new foods, or sick contacts.  Pt well appearing, tolerating po.  no gi or surgical history. discussed ob for chaz monitoring, fht likely dc.

## 2025-02-08 NOTE — ED PROVIDER NOTE - PHYSICAL EXAMINATION
GENERAL: well appearing  HEAD: normocephalic, atraumatic  HEENT: normal conjunctiva, oral mucosa moist  CARDIAC: regular rate and rhythm  PULM: speaking in full sentences, no increased work of breathing  GI: abdomen nondistended, soft, nontender  : no suprapubic tenderness, uterine fundus at the level of the umbilicus  NEURO: moving all 4 extremities, answering questions appropriately  MSK: no peripheral edema  SKIN: extremities warm

## 2025-02-08 NOTE — ED PROVIDER NOTE - PATIENT PORTAL LINK FT
You can access the FollowMyHealth Patient Portal offered by [facility] by registering at the following website: [email or web address]. By joining [employer]’s FollowMyHealth portal, you will also be able to view your health information using other applications (apps) compatible with our system.

## 2025-02-08 NOTE — ED ADULT NURSE NOTE - OBJECTIVE STATEMENT
29y Female AOx4  33 weeks pregnant presents to the ED c/o diarrhea. Pt report x45 min ago she had x3 episodes of non-bloody diarrhea within 10min. Denies N/V or abdominal pain during this event.  Denies  fever/chills, SOB, chest pain. Spontaneous/unlabored respirations, speaking in full sentences. Side rails up, bed in lowest position, oriented to call bell, safety maintained.

## 2025-02-08 NOTE — ED PROVIDER NOTE - NSFOLLOWUPINSTRUCTIONS_ED_ALL_ED_FT
Today in the emergency department you were evaluated for diarrhea.  It is likely we will not know the source of the diarrhea.  Please continue stay well-hydrated.      Please follow-up with your OB/GYN as scheduled.  Please bring a copy of your results with you.  Please return to the emergency department for worsening of your symptoms.    You may take Acetaminophen over the counter as needed for pain and/or fever. Use as directed and see medication warnings.

## 2025-02-08 NOTE — ED PROVIDER NOTE - OBJECTIVE STATEMENT
29-year-old female G1, P0 at approximately 33 weeks based on ultrasound, LMP presenting with 1 episode of diarrhea earlier this afternoon.  Per patient she was out grocery shopping with her partner when she had an episode of significant diarrhea.  She was concerned that it would be a source of infection for her pregnancy and presented to the ED for further evaluation.  She otherwise had no significant headache, chest pain, shortness of breath, N/V/abdominal pain, dysuria, fever/chills.  NKDA.

## 2025-02-19 ENCOUNTER — APPOINTMENT (OUTPATIENT)
Dept: OBGYN | Facility: CLINIC | Age: 30
End: 2025-02-19
Payer: COMMERCIAL

## 2025-02-19 VITALS
DIASTOLIC BLOOD PRESSURE: 69 MMHG | WEIGHT: 188 LBS | OXYGEN SATURATION: 97 % | SYSTOLIC BLOOD PRESSURE: 101 MMHG | HEART RATE: 86 BPM | BODY MASS INDEX: 27.85 KG/M2 | HEIGHT: 69 IN

## 2025-02-19 PROCEDURE — 0502F SUBSEQUENT PRENATAL CARE: CPT

## 2025-02-19 PROCEDURE — 81003 URINALYSIS AUTO W/O SCOPE: CPT | Mod: QW

## 2025-02-20 LAB
BILIRUB UR QL STRIP: NORMAL
CLARITY UR: CLEAR
COLLECTION METHOD: NORMAL
GLUCOSE UR-MCNC: NORMAL
HCG UR QL: 0.2 EU/DL
HGB UR QL STRIP.AUTO: NORMAL
KETONES UR-MCNC: NORMAL
LEUKOCYTE ESTERASE UR QL STRIP: NORMAL
NITRITE UR QL STRIP: NORMAL
PH UR STRIP: 7
PROT UR STRIP-MCNC: NORMAL
SP GR UR STRIP: 1.02

## 2025-02-21 LAB — BACTERIA UR CULT: NORMAL

## 2025-02-25 ENCOUNTER — OUTPATIENT (OUTPATIENT)
Dept: INPATIENT UNIT | Facility: HOSPITAL | Age: 30
LOS: 1 days | Discharge: HOME | End: 2025-02-25
Payer: COMMERCIAL

## 2025-02-25 ENCOUNTER — NON-APPOINTMENT (OUTPATIENT)
Age: 30
End: 2025-02-25

## 2025-02-25 VITALS
DIASTOLIC BLOOD PRESSURE: 62 MMHG | OXYGEN SATURATION: 98 % | HEART RATE: 85 BPM | SYSTOLIC BLOOD PRESSURE: 102 MMHG | RESPIRATION RATE: 18 BRPM | TEMPERATURE: 98 F

## 2025-02-25 DIAGNOSIS — O26.899 OTHER SPECIFIED PREGNANCY RELATED CONDITIONS, UNSPECIFIED TRIMESTER: ICD-10-CM

## 2025-02-25 LAB — AMNISURE ROM (RUPTURE OF MEMBRANES): NEGATIVE — SIGNIFICANT CHANGE UP

## 2025-02-25 PROCEDURE — 84112 EVAL AMNIOTIC FLUID PROTEIN: CPT

## 2025-02-25 PROCEDURE — 59025 FETAL NON-STRESS TEST: CPT

## 2025-02-25 PROCEDURE — 76818 FETAL BIOPHYS PROFILE W/NST: CPT

## 2025-02-25 PROCEDURE — 59025 FETAL NON-STRESS TEST: CPT | Mod: 26

## 2025-02-25 PROCEDURE — 99221 1ST HOSP IP/OBS SF/LOW 40: CPT | Mod: 25

## 2025-02-25 PROCEDURE — 99214 OFFICE O/P EST MOD 30 MIN: CPT

## 2025-02-25 NOTE — OB PROVIDER TRIAGE NOTE - NSOBPROVIDERNOTE_OBGYN_ALL_OB_FT
A/P:30 y/o G 1P0 @ __w__d (JIM 3/31/2025) presents for decreased FM x 2 days. A/P:28 y/o G 1P0 @ __w__d (JIM 3/31/2025) presents for decreased FM x 2 days. Currently feeling FM.  - NST reactive  - SSE/SVE   - BPP pending A/P:30 y/o G 1P0 @ 35w1d (JIM 3/31/2025) presents for decreased FM x 2 days. Currently feeling FM.  - NST reactive  - SSE/SVE   - BPP pending    Addeumdum 1700:  BPP 8/8, ANA 15, vertex, , adequate fetal movement, patient continuing to appreciate FM -  images uploaded to [facility]  NST reactive  Amnisure negative    Patient stable for discharge with routine follow up  Return precautions discussed  All questions answered

## 2025-02-25 NOTE — OB PROVIDER TRIAGE NOTE - NSHPPHYSICALEXAM_GEN_ALL_CORE
Gen: NAD  Abd: soft non tender gravid  Extrem: no calf tenderness  SSE:  SVE:  EFM: ___ moderate variability + accels no decels  [place]: q __  Sono: vertex presentation Gen: NAD  Abd: soft non tender gravid  Extrem: no calf tenderness  SSE:  SVE:  EFM: 135 moderate variability + accels no decels  [place]: no ctx  Sono: vertex presentation Gen: NAD  Abd: soft non tender gravid  Extrem: no calf tenderness  SSE: increased cervical mucous, no VB, no pooling, amnisure obtained   SVE: closed/long  EFM: 135 moderate variability + accels no decels  [place]: no ctx  Sono: vertex presentation

## 2025-02-25 NOTE — OB RN TRIAGE NOTE - NSICDXPASTMEDICALHX_GEN_ALL_CORE_FT
PAST MEDICAL HISTORY:  Anxiety and depression     Asthma, exercise induced     Attention deficit disorder (ADD) in adult     HSV infection

## 2025-02-25 NOTE — OB PROVIDER TRIAGE NOTE - HISTORY OF PRESENT ILLNESS
28 y/o G 1P0 @ __w__d (JIM 3/31/2025) presents for decreased FM x 2 days. Patient currently feels FM. She also reports LOF clear at 1100 this morning without continued leaking.  +FM. GBS unknown. EFW  2233g 4#15 87th%ile on 2/3. Denies ctx, VB.    PNC c/b asthma, R sided fetal pyelectasis family hx Rett syndrome (sisters child)    ObHx: Primigravida   GynHx: Denies hx of fibroids, ovarian cysts, abnml PAP smears, STDs  MedHx: Denies hx of HTN, DM, asthma, thyroid problems, blood clots/bleeding problems, hx of blood transfusions  Meds: PNV  All: NKDA  PSHx: Denies  FHx: Denies hx of blood clots/bleeding problems  Social: Denies alcohol/tobacco/drug use in pregnancy  Psych: Denies hx of anxiety/depression   30 y/o  @ 35w1d (JIM 3/31/2025) presents for decreased FM x 2 days. Patient currently feels FM. She also reports LOF clear at 1100 this morning without continued leaking. GBS unknown. EFW  2233g 4#15 87th%ile on 2/3. Denies ctx, VB.    PNC c/b asthma, R sided fetal pyelectasis family hx Rett syndrome (sisters child)    ObHx: Primigravida   GynHx: HSV (last outbreak 2 years ago). Denies hx of fibroids, ovarian cysts, abnml PAP smears, STDs  MedHx: Denies hx of HTN, DM, asthma, thyroid problems, blood clots/bleeding problems, hx of blood transfusions  Meds: PNV, Fe  All: NKDA  PSHx: Denies  FHx: Denies hx of blood clots/bleeding problems  Social: Denies alcohol/tobacco/drug use in pregnancy  Psych: + anxiety/depression, states well controlled in pregnancy    30 y/o  @ 35w1d (JIM 3/31/2025) presents for decreased FM x 2 days. Patient currently feels FM. She also reports LOF clear at 1100 this morning without continued leaking. GBS unknown. EFW 2233g 4#15 87th%ile on 2/3. Denies ctx, VB.    PNC c/b asthma, R sided fetal pyelectasis family hx Rett syndrome (sisters child)    ObHx: Primigravida   GynHx: HSV (last outbreak 2 years ago). Denies hx of fibroids, ovarian cysts, abnml PAP smears, STDs  MedHx: Denies hx of HTN, DM, asthma, thyroid problems, blood clots/bleeding problems, hx of blood transfusions  Meds: PNV, Fe  All: NKDA  PSHx: Denies  FHx: Denies hx of blood clots/bleeding problems  Social: Denies alcohol/tobacco/drug use in pregnancy  Psych: + anxiety/depression, states well controlled in pregnancy

## 2025-02-25 NOTE — OB PROVIDER TRIAGE NOTE - PATIENT'S SEXUAL ORIENTATION
Recommend urgent evaluation of she experiences worsening of symptoms. She can be seen at [facility]. If symptoms are stable, work up can be determined at the time of the visit.   Thank you!  Blanca Heard MD   Heterosexual

## 2025-02-26 PROBLEM — Z78.9 OTHER SPECIFIED HEALTH STATUS: Chronic | Status: INACTIVE | Noted: 2025-02-08 | Resolved: 2025-02-25

## 2025-02-27 DIAGNOSIS — J45.990 EXERCISE INDUCED BRONCHOSPASM: ICD-10-CM

## 2025-02-27 DIAGNOSIS — F32.A DEPRESSION, UNSPECIFIED: ICD-10-CM

## 2025-02-27 DIAGNOSIS — F41.9 ANXIETY DISORDER, UNSPECIFIED: ICD-10-CM

## 2025-02-27 DIAGNOSIS — F98.8 OTHER SPECIFIED BEHAVIORAL AND EMOTIONAL DISORDERS WITH ONSET USUALLY OCCURRING IN CHILDHOOD AND ADOLESCENCE: ICD-10-CM

## 2025-02-27 DIAGNOSIS — O99.343 OTHER MENTAL DISORDERS COMPLICATING PREGNANCY, THIRD TRIMESTER: ICD-10-CM

## 2025-03-03 ENCOUNTER — NON-APPOINTMENT (OUTPATIENT)
Age: 30
End: 2025-03-03

## 2025-03-05 ENCOUNTER — RESULT CHARGE (OUTPATIENT)
Age: 30
End: 2025-03-05

## 2025-03-05 ENCOUNTER — ASOB RESULT (OUTPATIENT)
Age: 30
End: 2025-03-05

## 2025-03-05 ENCOUNTER — APPOINTMENT (OUTPATIENT)
Dept: OBGYN | Facility: CLINIC | Age: 30
End: 2025-03-05
Payer: COMMERCIAL

## 2025-03-05 ENCOUNTER — APPOINTMENT (OUTPATIENT)
Dept: ANTEPARTUM | Facility: CLINIC | Age: 30
End: 2025-03-05
Payer: COMMERCIAL

## 2025-03-05 PROBLEM — F98.8 OTHER SPECIFIED BEHAVIORAL AND EMOTIONAL DISORDERS WITH ONSET USUALLY OCCURRING IN CHILDHOOD AND ADOLESCENCE: Chronic | Status: ACTIVE | Noted: 2025-02-25

## 2025-03-05 PROBLEM — J45.990 EXERCISE INDUCED BRONCHOSPASM: Chronic | Status: ACTIVE | Noted: 2025-02-25

## 2025-03-05 PROBLEM — B00.9 HERPESVIRAL INFECTION, UNSPECIFIED: Chronic | Status: ACTIVE | Noted: 2025-02-25

## 2025-03-05 PROBLEM — F41.9 ANXIETY DISORDER, UNSPECIFIED: Chronic | Status: ACTIVE | Noted: 2025-02-25

## 2025-03-05 LAB
BILIRUB UR QL STRIP: NORMAL
CLARITY UR: CLEAR
COLLECTION METHOD: NORMAL
GLUCOSE UR-MCNC: NORMAL
HCG UR QL: 0.2 EU/DL
HGB UR QL STRIP.AUTO: NORMAL
KETONES UR-MCNC: NORMAL
LEUKOCYTE ESTERASE UR QL STRIP: NORMAL
NITRITE UR QL STRIP: NORMAL
PH UR STRIP: 7
PROT UR STRIP-MCNC: 1
SP GR UR STRIP: 1.02

## 2025-03-05 PROCEDURE — 0502F SUBSEQUENT PRENATAL CARE: CPT

## 2025-03-05 PROCEDURE — 76819 FETAL BIOPHYS PROFIL W/O NST: CPT | Mod: 59

## 2025-03-05 PROCEDURE — 81003 URINALYSIS AUTO W/O SCOPE: CPT | Mod: QW

## 2025-03-05 PROCEDURE — 76816 OB US FOLLOW-UP PER FETUS: CPT

## 2025-03-05 RX ORDER — VALACYCLOVIR 1 G/1
1 TABLET, FILM COATED ORAL DAILY
Qty: 60 | Refills: 2 | Status: ACTIVE | COMMUNITY
Start: 2025-03-05 | End: 1900-01-01

## 2025-03-10 LAB — B-HEM STREP SPEC QL CULT: NORMAL

## 2025-03-11 ENCOUNTER — NON-APPOINTMENT (OUTPATIENT)
Age: 30
End: 2025-03-11

## 2025-03-12 ENCOUNTER — NON-APPOINTMENT (OUTPATIENT)
Age: 30
End: 2025-03-12

## 2025-03-12 ENCOUNTER — APPOINTMENT (OUTPATIENT)
Dept: OBGYN | Facility: CLINIC | Age: 30
End: 2025-03-12
Payer: COMMERCIAL

## 2025-03-12 PROCEDURE — 0502F SUBSEQUENT PRENATAL CARE: CPT

## 2025-03-12 PROCEDURE — 81003 URINALYSIS AUTO W/O SCOPE: CPT | Mod: QW

## 2025-03-13 LAB
BILIRUB UR QL STRIP: NEGATIVE
GLUCOSE UR-MCNC: NEGATIVE
HCG UR QL: 0.2 EU/DL
HGB UR QL STRIP.AUTO: NEGATIVE
KETONES UR-MCNC: NEGATIVE
LEUKOCYTE ESTERASE UR QL STRIP: NORMAL
NITRITE UR QL STRIP: NEGATIVE
PH UR STRIP: 7
PROT UR STRIP-MCNC: NEGATIVE
SP GR UR STRIP: 1.02

## 2025-03-17 ENCOUNTER — APPOINTMENT (OUTPATIENT)
Dept: OBGYN | Facility: CLINIC | Age: 30
End: 2025-03-17
Payer: COMMERCIAL

## 2025-03-17 LAB
APPEARANCE: CLEAR
BILIRUBIN URINE: NEGATIVE
BLOOD URINE: NEGATIVE
COLOR: YELLOW
GLUCOSE QUALITATIVE U: NEGATIVE
KETONES URINE: NEGATIVE
LEUKOCYTE ESTERASE URINE: NEGATIVE
NITRITE URINE: NEGATIVE
PH URINE: 6
PROTEIN URINE: NEGATIVE
SPECIFIC GRAVITY URINE: 1.01
UROBILINOGEN URINE: 0.2 (ref 0.2–?)

## 2025-03-17 PROCEDURE — 0502F SUBSEQUENT PRENATAL CARE: CPT

## 2025-03-24 ENCOUNTER — APPOINTMENT (OUTPATIENT)
Dept: ANTEPARTUM | Facility: CLINIC | Age: 30
End: 2025-03-24
Payer: COMMERCIAL

## 2025-03-24 ENCOUNTER — NON-APPOINTMENT (OUTPATIENT)
Age: 30
End: 2025-03-24

## 2025-03-24 ENCOUNTER — ASOB RESULT (OUTPATIENT)
Age: 30
End: 2025-03-24

## 2025-03-24 PROCEDURE — 76816 OB US FOLLOW-UP PER FETUS: CPT

## 2025-03-24 PROCEDURE — 76819 FETAL BIOPHYS PROFIL W/O NST: CPT

## 2025-03-25 ENCOUNTER — INPATIENT (INPATIENT)
Facility: HOSPITAL | Age: 30
LOS: 3 days | Discharge: HOME | DRG: 833 | End: 2025-03-29
Attending: OBSTETRICS & GYNECOLOGY | Admitting: OBSTETRICS & GYNECOLOGY
Payer: COMMERCIAL

## 2025-03-25 VITALS
RESPIRATION RATE: 18 BRPM | HEART RATE: 87 BPM | HEIGHT: 70 IN | TEMPERATURE: 98 F | WEIGHT: 199.08 LBS | DIASTOLIC BLOOD PRESSURE: 64 MMHG | SYSTOLIC BLOOD PRESSURE: 106 MMHG

## 2025-03-25 DIAGNOSIS — O36.63X1 MATERNAL CARE FOR EXCESSIVE FETAL GROWTH, THIRD TRIMESTER, FETUS 1: ICD-10-CM

## 2025-03-25 LAB
BASOPHILS # BLD AUTO: 0.02 K/UL — SIGNIFICANT CHANGE UP (ref 0–0.2)
BASOPHILS NFR BLD AUTO: 0.2 % — SIGNIFICANT CHANGE UP (ref 0–2)
EOSINOPHIL # BLD AUTO: 0.07 K/UL — SIGNIFICANT CHANGE UP (ref 0–0.5)
EOSINOPHIL NFR BLD AUTO: 0.7 % — SIGNIFICANT CHANGE UP (ref 0–6)
HCT VFR BLD CALC: 36.1 % — SIGNIFICANT CHANGE UP (ref 34.5–45)
HGB BLD-MCNC: 12.5 G/DL — SIGNIFICANT CHANGE UP (ref 11.5–15.5)
IMM GRANULOCYTES # BLD AUTO: 0.04 K/UL — SIGNIFICANT CHANGE UP (ref 0–0.07)
IMM GRANULOCYTES NFR BLD AUTO: 0.4 % — SIGNIFICANT CHANGE UP (ref 0–0.9)
LYMPHOCYTES # BLD AUTO: 1.68 K/UL — SIGNIFICANT CHANGE UP (ref 1–3.3)
LYMPHOCYTES NFR BLD AUTO: 16 % — SIGNIFICANT CHANGE UP (ref 13–44)
MCHC RBC-ENTMCNC: 31.2 PG — SIGNIFICANT CHANGE UP (ref 27–34)
MCHC RBC-ENTMCNC: 34.6 G/DL — SIGNIFICANT CHANGE UP (ref 32–36)
MCV RBC AUTO: 90 FL — SIGNIFICANT CHANGE UP (ref 80–100)
MONOCYTES # BLD AUTO: 0.89 K/UL — SIGNIFICANT CHANGE UP (ref 0–0.9)
MONOCYTES NFR BLD AUTO: 8.5 % — SIGNIFICANT CHANGE UP (ref 2–14)
NEUTROPHILS # BLD AUTO: 7.78 K/UL — HIGH (ref 1.8–7.4)
NEUTROPHILS NFR BLD AUTO: 74.2 % — SIGNIFICANT CHANGE UP (ref 43–77)
NRBC # BLD AUTO: 0 K/UL — SIGNIFICANT CHANGE UP (ref 0–0)
NRBC # FLD: 0 K/UL — SIGNIFICANT CHANGE UP (ref 0–0)
NRBC BLD AUTO-RTO: 0 /100 WBCS — SIGNIFICANT CHANGE UP (ref 0–0)
PLATELET # BLD AUTO: 297 K/UL — SIGNIFICANT CHANGE UP (ref 150–400)
PMV BLD: 9.6 FL — SIGNIFICANT CHANGE UP (ref 7–13)
RBC # BLD: 4.01 M/UL — SIGNIFICANT CHANGE UP (ref 3.8–5.2)
RBC # FLD: 13.2 % — SIGNIFICANT CHANGE UP (ref 10.3–14.5)
WBC # BLD: 10.48 K/UL — SIGNIFICANT CHANGE UP (ref 3.8–10.5)
WBC # FLD AUTO: 10.48 K/UL — SIGNIFICANT CHANGE UP (ref 3.8–10.5)

## 2025-03-25 PROCEDURE — 36415 COLL VENOUS BLD VENIPUNCTURE: CPT

## 2025-03-25 PROCEDURE — 85014 HEMATOCRIT: CPT

## 2025-03-25 PROCEDURE — 85025 COMPLETE CBC W/AUTO DIFF WBC: CPT

## 2025-03-25 PROCEDURE — C1889: CPT

## 2025-03-25 PROCEDURE — 86780 TREPONEMA PALLIDUM: CPT

## 2025-03-25 PROCEDURE — 59050 FETAL MONITOR W/REPORT: CPT

## 2025-03-25 PROCEDURE — 94760 N-INVAS EAR/PLS OXIMETRY 1: CPT

## 2025-03-25 PROCEDURE — 85018 HEMOGLOBIN: CPT

## 2025-03-25 PROCEDURE — 86901 BLOOD TYPING SEROLOGIC RH(D): CPT

## 2025-03-25 PROCEDURE — 86850 RBC ANTIBODY SCREEN: CPT

## 2025-03-25 PROCEDURE — 59200 INSERT CERVICAL DILATOR: CPT

## 2025-03-25 PROCEDURE — 86900 BLOOD TYPING SEROLOGIC ABO: CPT

## 2025-03-25 RX ORDER — SODIUM CHLORIDE 9 G/1000ML
1000 INJECTION, SOLUTION INTRAVENOUS
Refills: 0 | Status: DISCONTINUED | OUTPATIENT
Start: 2025-03-25 | End: 2025-03-27

## 2025-03-25 RX ORDER — CITRIC ACID/SODIUM CITRATE 300-500 MG
30 SOLUTION, ORAL ORAL ONCE
Refills: 0 | Status: DISCONTINUED | OUTPATIENT
Start: 2025-03-25 | End: 2025-03-27

## 2025-03-25 RX ORDER — OXYTOCIN-SODIUM CHLORIDE 0.9% IV SOLN 30 UNIT/500ML 30-0.9/5 UT/ML-%
167 SOLUTION INTRAVENOUS
Qty: 30 | Refills: 0 | Status: DISCONTINUED | OUTPATIENT
Start: 2025-03-25 | End: 2025-03-29

## 2025-03-25 RX ADMIN — Medication 500 MILLIGRAM(S): at 22:58

## 2025-03-25 NOTE — OB PROVIDER H&P - NSLOWPPHRISK_OBGYN_A_OB
No previous uterine incision/Doe Pregnancy/Less than or equal to 4 previous vaginal births/No known bleeding disorder/No history of postpartum hemorrhage/No other PPH risks indicated

## 2025-03-25 NOTE — OB PROVIDER H&P - NSGENETICTESTING_OBGYN_ALL_OB
Rx Refill Note  Requested Prescriptions     Pending Prescriptions Disp Refills    atorvastatin (LIPITOR) 40 MG tablet [Pharmacy Med Name: ATORVASTATIN 40 MG TABLET] 90 tablet 1     Sig: TAKE 1 TABLET BY MOUTH EVERY DAY      Last office visit with prescribing clinician: 4/11/2024   Last telemedicine visit with prescribing clinician: Visit date not found   Next office visit with prescribing clinician: 9/30/2024                         Would you like a call back once the refill request has been completed: [] Yes [] No    If the office needs to give you a call back, can they leave a voicemail: [] Yes [] No    Alba Lazaro LPN  09/04/24, 09:53 EDT   Not applicable

## 2025-03-25 NOTE — OB PROVIDER H&P - HISTORY OF PRESENT ILLNESS
30y  at 39w2d GA by LMP consistent with 1st trimester sono who presents to L&D for IOL. Patient denies vaginal bleeding, contractions and leakage of fluid. She endorses good fetal movement. Denies fevers, chills, nausea, vomiting, chest pain, SOB, dizziness and headache. No other complaints at this time.   JIM: 3/31/25  LMP: 24    Prenatal course is significant for:  suspected LGA (EFW 4200g)  hx asthma  hx fetal pyelectasis  FHX Rett syndrome in niece    POB:   PGYN: -fibroids, -ovarian cysts, denies STD hx, denies abnormal PAPs   PMH: exercise induced asthma, hsv-2  PSH: Denies  SH: Denies EtOH, tobacco and illicit drug use during this pregnancy; feels safe at home   Meds: PNVs, valtrex  Allergies: NKDA    Sono: vertex, posterior placenta  EFW: 4200    T(C): --  HR: 87 (25 @ 20:19) (87 - 87)  BP: 106/64 (25 @ 20:19) (106/64 - 106/64)  RR: 18 (25 @ 20:19) (18 - 18)  SpO2: --    Gen: NAD, well-appearing, AAOx3   Abd: Soft, gravid  Ext: non-tender, non-edematous  SSE: defer  SVE:  0.5/70/-3 firm  Bedside sono: vertex  FHT: baseline 140, moderate variability, +accels, -decels   [place]: no ctx seen

## 2025-03-25 NOTE — OB PROVIDER H&P - ASSESSMENT
A/P: 30y  at 39w2d GA by LMP consistent with 1st trimester sono who presents to L&D for IOL  -Admit to L&D  -Consent  -Admission labs  -Reg diet  -IV fluids  -IOL with cytotec, v cyto placed  -Fetus: Cat I tracing. Continuous toco and fetal monitoring.   -GBS: Negative, no GBS ppx required   -Analgesia: epi prn    Discussed with Dr. Boyle

## 2025-03-26 ENCOUNTER — APPOINTMENT (OUTPATIENT)
Dept: OBGYN | Facility: CLINIC | Age: 30
End: 2025-03-26

## 2025-03-26 LAB — T PALLIDUM AB TITR SER: NEGATIVE — SIGNIFICANT CHANGE UP

## 2025-03-26 RX ORDER — OXYTOCIN-SODIUM CHLORIDE 0.9% IV SOLN 30 UNIT/500ML 30-0.9/5 UT/ML-%
SOLUTION INTRAVENOUS
Qty: 30 | Refills: 0 | Status: DISCONTINUED | OUTPATIENT
Start: 2025-03-26 | End: 2025-03-27

## 2025-03-26 RX ADMIN — Medication 500 MILLIGRAM(S): at 21:52

## 2025-03-26 RX ADMIN — SODIUM CHLORIDE 125 MILLILITER(S): 9 INJECTION, SOLUTION INTRAVENOUS at 09:00

## 2025-03-26 RX ADMIN — OXYTOCIN-SODIUM CHLORIDE 0.9% IV SOLN 30 UNIT/500ML 2 MILLIUNIT(S)/MIN: 30-0.9/5 SOLUTION at 15:17

## 2025-03-26 NOTE — OB RN DELIVERY SUMMARY - NSSELHIDDEN_OBGYN_ALL_OB_FT
[NS_DeliveryAttending1_OBGYN_ALL_OB_FT:[id number]],[NS_DeliveryRN_OBGYN_ALL_OB_FT:[id number]]

## 2025-03-26 NOTE — OB RN DELIVERY SUMMARY - NS_SEPSISRSKCALC_OBGYN_ALL_OB_FT
EOS calculated successfully. EOS Risk Factor: 0.5/1000 live births ([facility] national incidence); GA=39w3d; Temp=98.1; ROM=8.017; GBS='Negative'; Antibiotics='No antibiotics or any antibiotics < 2 hrs prior to birth'

## 2025-03-26 NOTE — PROGRESS NOTE ADULT - SUBJECTIVE AND OBJECTIVE BOX
31 yo G1 at 39w2d admitted for IOL  Cervical balloon removed  AROM clear  Cervical exa 5/90/-2  cephalic    EFW 4200 g     with moderate variability and +accels, no decels Cat 1  [place] q 2

## 2025-03-26 NOTE — OB RN DELIVERY SUMMARY - NS_GBSABX_OBGYN_ALL_OB
Procedure Date: 2018      EEG #:  -10.        DATE OF RECORDIN2018      DURATION OF RECORDIN hours and 46 minutes.      Day #10 of video EEG monitoring.        CLINICAL HISTORY:  This patient is a 57-year-old woman with a complicated medical history including pancreatic transplant x2, fluctuating encephalopathy and a remote history of seizures.  She was recently admitted for altered mental status and abnormal movements.  EEG was requested for evaluation for seizures.      CURRENT MEDICATIONS:  Keppra, lacosamide, Depakote, Versed.      TECHNICAL SUMMARY: This continuous video- EEG monitoring procedure was performed with 23 scalp electrodes in 10-20 electrode system placements, and additional scalp, precordial and other surface electrodes used for electrical referencing and artifact detection.  Video monitoring was utilized and periodically reviewed by EEG technologists and the physician for electroclinical correlations.     BACKGROUND ACTIVITIES.  The background activities of this EEG consisted of severe generalized slowing with generalized periodic epileptiform discharges (GPEDS) alternating with periods of pseudo generalized periodic epileptiform discharges with intermittent attenuation of the background.      Hyperventilation and photic stimulation were not performed.  No clear sleep-wake cycles were observed during this recording.      No clear clinical or electrographic seizures were observed during this recording.      SUMMARY OF DAY #10 OF VIDEO EEG MONITORING:  This EEG is markedly abnormal due to the presence of prolonged periods of generalized periodic epileptiform discharges (GPEDS) which alternates with periods of pseudo generalized periodic epileptiform discharges with intermittent attenuation of the background activities.  At this time, it is difficult to  if the generalized periodic epileptiform discharges represent nonconvulsive status epilepticus or not.  The  frequency of the GPEDS is about 2 Hz.  Clinical correlation is advised.         ROXIE MARTINEZ MD             D: 2018   T: 2018   MT: MAURICE      Name:     AYDE SHAFFER   MRN:      [id number]        Account:        [id number]   :      1960           Procedure Date: 2018      Document: [id number]       N/A

## 2025-03-26 NOTE — PROGRESS NOTE ADULT - SUBJECTIVE AND OBJECTIVE BOX
29 yo G1 at 39w2d admitted for IOL  EFW 4200 grams    Cervical balloon placed at 0900 today   Pitocin ordered  1/60/-3    FHR  140 with moderate variability, no decels  [place] q 1-4

## 2025-03-26 NOTE — PROGRESS NOTE ADULT - ASSESSMENT
IUP 39w2d undergoing IOL  Stable condition    EFM  Cervical balloon  Pitocin  Continue current management

## 2025-03-27 PROCEDURE — 59400 OBSTETRICAL CARE: CPT

## 2025-03-27 RX ORDER — IBUPROFEN 200 MG
600 TABLET ORAL EVERY 6 HOURS
Refills: 0 | Status: COMPLETED | OUTPATIENT
Start: 2025-03-27 | End: 2026-02-23

## 2025-03-27 RX ORDER — DIBUCAINE 10 MG/G
1 OINTMENT TOPICAL EVERY 6 HOURS
Refills: 0 | Status: DISCONTINUED | OUTPATIENT
Start: 2025-03-27 | End: 2025-03-29

## 2025-03-27 RX ORDER — MODIFIED LANOLIN 100 %
1 CREAM (GRAM) TOPICAL EVERY 6 HOURS
Refills: 0 | Status: DISCONTINUED | OUTPATIENT
Start: 2025-03-27 | End: 2025-03-29

## 2025-03-27 RX ORDER — IBUPROFEN 200 MG
600 TABLET ORAL EVERY 6 HOURS
Refills: 0 | Status: DISCONTINUED | OUTPATIENT
Start: 2025-03-27 | End: 2025-03-29

## 2025-03-27 RX ORDER — OXYCODONE HYDROCHLORIDE 30 MG/1
5 TABLET ORAL ONCE
Refills: 0 | Status: DISCONTINUED | OUTPATIENT
Start: 2025-03-27 | End: 2025-03-29

## 2025-03-27 RX ORDER — KETOROLAC TROMETHAMINE 30 MG/ML
30 INJECTION, SOLUTION INTRAMUSCULAR; INTRAVENOUS ONCE
Refills: 0 | Status: DISCONTINUED | OUTPATIENT
Start: 2025-03-27 | End: 2025-03-27

## 2025-03-27 RX ORDER — PRENATAL 136/IRON/FOLIC ACID 27 MG-1 MG
1 TABLET ORAL DAILY
Refills: 0 | Status: DISCONTINUED | OUTPATIENT
Start: 2025-03-27 | End: 2025-03-29

## 2025-03-27 RX ORDER — ACETAMINOPHEN 500 MG/5ML
975 LIQUID (ML) ORAL
Refills: 0 | Status: DISCONTINUED | OUTPATIENT
Start: 2025-03-27 | End: 2025-03-29

## 2025-03-27 RX ORDER — WITCH HAZEL LEAF
1 FLUID EXTRACT MISCELLANEOUS EVERY 4 HOURS
Refills: 0 | Status: DISCONTINUED | OUTPATIENT
Start: 2025-03-27 | End: 2025-03-29

## 2025-03-27 RX ORDER — OXYTOCIN-SODIUM CHLORIDE 0.9% IV SOLN 30 UNIT/500ML 30-0.9/5 UT/ML-%
167 SOLUTION INTRAVENOUS
Qty: 30 | Refills: 0 | Status: DISCONTINUED | OUTPATIENT
Start: 2025-03-27 | End: 2025-03-29

## 2025-03-27 RX ORDER — DIPHENHYDRAMINE HCL 12.5MG/5ML
25 ELIXIR ORAL EVERY 6 HOURS
Refills: 0 | Status: DISCONTINUED | OUTPATIENT
Start: 2025-03-27 | End: 2025-03-29

## 2025-03-27 RX ORDER — SIMETHICONE 80 MG
80 TABLET,CHEWABLE ORAL EVERY 4 HOURS
Refills: 0 | Status: DISCONTINUED | OUTPATIENT
Start: 2025-03-27 | End: 2025-03-29

## 2025-03-27 RX ORDER — MAGNESIUM HYDROXIDE 400 MG/5ML
30 SUSPENSION ORAL
Refills: 0 | Status: DISCONTINUED | OUTPATIENT
Start: 2025-03-27 | End: 2025-03-29

## 2025-03-27 RX ORDER — PRAMOXINE HCL 1 %
1 GEL (GRAM) TOPICAL EVERY 4 HOURS
Refills: 0 | Status: DISCONTINUED | OUTPATIENT
Start: 2025-03-27 | End: 2025-03-29

## 2025-03-27 RX ORDER — BENZOCAINE 220 MG/G
1 SPRAY, METERED PERIODONTAL EVERY 6 HOURS
Refills: 0 | Status: DISCONTINUED | OUTPATIENT
Start: 2025-03-27 | End: 2025-03-29

## 2025-03-27 RX ORDER — CLOSTRIDIUM TETANI TOXOID ANTIGEN (FORMALDEHYDE INACTIVATED), CORYNEBACTERIUM DIPHTHERIAE TOXOID ANTIGEN (FORMALDEHYDE INACTIVATED), BORDETELLA PERTUSSIS TOXOID ANTIGEN (GLUTARALDEHYDE INACTIVATED), BORDETELLA PERTUSSIS FILAMENTOUS HEMAGGLUTININ ANTIGEN (FORMALDEHYDE INACTIVATED), BORDETELLA PERTUSSIS PERTACTIN ANTIGEN, AND BORDETELLA PERTUSSIS FIMBRIAE 2/3 ANTIGEN 5; 2; 2.5; 5; 3; 5 [LF]/.5ML; [LF]/.5ML; UG/.5ML; UG/.5ML; UG/.5ML; UG/.5ML
0.5 INJECTION, SUSPENSION INTRAMUSCULAR ONCE
Refills: 0 | Status: DISCONTINUED | OUTPATIENT
Start: 2025-03-27 | End: 2025-03-29

## 2025-03-27 RX ORDER — OXYCODONE HYDROCHLORIDE 30 MG/1
5 TABLET ORAL
Refills: 0 | Status: DISCONTINUED | OUTPATIENT
Start: 2025-03-27 | End: 2025-03-29

## 2025-03-27 RX ORDER — HYDROCORTISONE 10 MG/G
1 CREAM TOPICAL EVERY 6 HOURS
Refills: 0 | Status: DISCONTINUED | OUTPATIENT
Start: 2025-03-27 | End: 2025-03-29

## 2025-03-27 RX ADMIN — KETOROLAC TROMETHAMINE 30 MILLIGRAM(S): 30 INJECTION, SOLUTION INTRAMUSCULAR; INTRAVENOUS at 06:29

## 2025-03-27 RX ADMIN — Medication 975 MILLIGRAM(S): at 16:30

## 2025-03-27 RX ADMIN — Medication 600 MILLIGRAM(S): at 14:06

## 2025-03-27 RX ADMIN — Medication 975 MILLIGRAM(S): at 21:28

## 2025-03-27 RX ADMIN — Medication 975 MILLIGRAM(S): at 22:15

## 2025-03-27 RX ADMIN — Medication 975 MILLIGRAM(S): at 15:56

## 2025-03-27 RX ADMIN — Medication 600 MILLIGRAM(S): at 18:37

## 2025-03-27 RX ADMIN — Medication 500 MILLIGRAM(S): at 21:30

## 2025-03-27 RX ADMIN — Medication 600 MILLIGRAM(S): at 18:33

## 2025-03-27 RX ADMIN — Medication 600 MILLIGRAM(S): at 13:06

## 2025-03-27 NOTE — OB PROVIDER LABOR PROGRESS NOTE - NSVAGINALEXAM_OBGYN_ALL_OB_DT
26-Mar-2025 09:10
26-Mar-2025 23:37
26-Mar-2025 04:45
26-Mar-2025 21:09
26-Mar-2025 01:33
27-Mar-2025 03:56
Dry/Warm

## 2025-03-27 NOTE — OB PROVIDER LABOR PROGRESS NOTE - NS_OBIHIFHRDETAILS_OBGYN_ALL_OB_FT
baseline 155, moderate variability, -accels, + recurrent late decels
baseline 150, minimal to moderate variability, +accels, -decels
cat 1 tracing
baseline 130, moderate variability, +accels, -decels
baseline 140, moderate variability, +accels, -decels
baseline 150, moderate variability, +accels, +early decels
baseline 130, moderate variability, +accels, -decels

## 2025-03-27 NOTE — OB PROVIDER LABOR PROGRESS NOTE - NS_SUBJECTIVE/OBJECTIVE_OBGYN_ALL_OB_FT
Pt is comfortable, feeling light rectal pressure
Pt is comfortable, was sleeping
Tracing note
Patient is feeling very uncomfortable with contractions
Pt is comfortable w epi
CB placed without difficulty 60/60
Pt is comfortable

## 2025-03-27 NOTE — OB PROVIDER DELIVERY SUMMARY - NSPROVIDERDELIVERYNOTE_OBGYN_ALL_OB_FT
31 yo  s/p  at 39w3d  Liveborn female at 0506 hours  Apgars 9/9  BW 4050 grams  Left periurethral laceration and left labial laceration Repaired with 2-0 vicryl  No complications  Placenta delivered intact at 0510 hours    Routine postpartum care.  EBL  150 mL    I was physically present for delivery    Dr Salmon

## 2025-03-27 NOTE — OB PROVIDER DELIVERY SUMMARY - NSSELHIDDEN_OBGYN_ALL_OB_FT
[NS_DeliveryAttending1_OBGYN_ALL_OB_FT:[id number]],[NS_DeliveryAssist1_OBGYN_ALL_OB_FT:[id number]]

## 2025-03-27 NOTE — PROGRESS NOTE ADULT - SUBJECTIVE AND OBJECTIVE BOX
31 yo G1 at 39w3d admitted for IOL  s/p cervical balloon    EFW 4200 gram    10/100/-1 at 0320    FHR  150 with minimal variability now  [place] q 2-3 minutes

## 2025-03-27 NOTE — OB PROVIDER LABOR PROGRESS NOTE - ASSESSMENT
Cont current mngt  Consider switch to Pitocin after next SVE
FHT cat II  Regular ctx  Decrease pit by half > 7  maternal repositioning to R lateral from L lateral  Recent IVF bolus after hypotensive episode from top-off. BP wnl for this patient (currently 89/69, which is her baseline since admit)
FHT cat I  Regular ctx on pit of 14  CB removed  AROM clear fluid
FHT cat I  Regular ctx on pit   Head well applied and lower on this exam  Anesthesia to bedside to eval for top off
FHT cat I  no contractions seen  Cyto #2 sent
FHT cat I with periods of cat II  Ctx regular on 7 of pit  Will start pushing
FHT cat I  Regular ctx (inteverted on toco)  Cervical exam the same  vcyto #3 placed  Discussed CB, pt is amenable to trying but would like to hold off for a little while- will reassess at next exam

## 2025-03-28 ENCOUNTER — NON-APPOINTMENT (OUTPATIENT)
Age: 30
End: 2025-03-28

## 2025-03-28 ENCOUNTER — TRANSCRIPTION ENCOUNTER (OUTPATIENT)
Age: 30
End: 2025-03-28

## 2025-03-28 LAB
HCT VFR BLD CALC: 30.8 % — LOW (ref 34.5–45)
HGB BLD-MCNC: 10.4 G/DL — LOW (ref 11.5–15.5)

## 2025-03-28 PROCEDURE — 59400 OBSTETRICAL CARE: CPT

## 2025-03-28 RX ORDER — PRENATAL 136/IRON/FOLIC ACID 27 MG-1 MG
1 TABLET ORAL
Qty: 0 | Refills: 0 | DISCHARGE
Start: 2025-03-28

## 2025-03-28 RX ORDER — IBUPROFEN 200 MG
1 TABLET ORAL
Qty: 0 | Refills: 0 | DISCHARGE
Start: 2025-03-28

## 2025-03-28 RX ORDER — ACETAMINOPHEN 500 MG/5ML
3 LIQUID (ML) ORAL
Qty: 0 | Refills: 0 | DISCHARGE
Start: 2025-03-28

## 2025-03-28 RX ADMIN — Medication 975 MILLIGRAM(S): at 23:15

## 2025-03-28 RX ADMIN — Medication 600 MILLIGRAM(S): at 18:53

## 2025-03-28 RX ADMIN — Medication 600 MILLIGRAM(S): at 01:00

## 2025-03-28 RX ADMIN — Medication 600 MILLIGRAM(S): at 18:46

## 2025-03-28 RX ADMIN — Medication 975 MILLIGRAM(S): at 16:10

## 2025-03-28 RX ADMIN — Medication 975 MILLIGRAM(S): at 04:20

## 2025-03-28 RX ADMIN — Medication 600 MILLIGRAM(S): at 07:21

## 2025-03-28 RX ADMIN — Medication 600 MILLIGRAM(S): at 12:40

## 2025-03-28 RX ADMIN — Medication 975 MILLIGRAM(S): at 15:13

## 2025-03-28 RX ADMIN — Medication 975 MILLIGRAM(S): at 22:22

## 2025-03-28 RX ADMIN — Medication 600 MILLIGRAM(S): at 06:38

## 2025-03-28 RX ADMIN — Medication 600 MILLIGRAM(S): at 00:13

## 2025-03-28 RX ADMIN — Medication 975 MILLIGRAM(S): at 03:36

## 2025-03-28 RX ADMIN — Medication 975 MILLIGRAM(S): at 09:51

## 2025-03-28 RX ADMIN — Medication 975 MILLIGRAM(S): at 10:51

## 2025-03-28 NOTE — DISCHARGE NOTE OB - PATIENT PORTAL LINK FT
You can access the FollowMyHealth Patient Portal offered by [facility] by registering at the following website: [email or web address]. By joining [facility]’s FollowMyHealth portal, you will also be able to view your health information using other applications (apps) compatible with our system.

## 2025-03-28 NOTE — PROGRESS NOTE ADULT - SUBJECTIVE AND OBJECTIVE BOX
S: Patient doing well. Minimal lochia. Pain controlled. Pt   PAST MEDICAL & SURGICAL HISTORY:  Asthma, exercise induced      Anxiety and depression      Attention deficit disorder (ADD) in adult      HSV infection      No significant past surgical history        O: Vital Signs Last 24 Hrs  T(C): 36.9 (28 Mar 2025 08:53), Max: 36.9 (28 Mar 2025 08:53)  T(F): 98.4 (28 Mar 2025 08:53), Max: 98.4 (28 Mar 2025 08:53)  HR: 81 (28 Mar 2025 08:53) (74 - 87)  BP: 94/57 (28 Mar 2025 08:53) (88/44 - 106/62)  BP(mean): --  RR: 16 (28 Mar 2025 08:53) (16 - 16)  SpO2: 96% (28 Mar 2025 08:53) (96% - 98%)    Parameters below as of 28 Mar 2025 08:53  Patient On (Oxygen Delivery Method): room air        Gen: NAD  Abd: soft, NT, ND, fundus firm below umbilicus  Lochia: moderate  Ext: no tenderness    Labs:                        10.4   x     )-----------( x        ( 28 Mar 2025 08:24 )             30.8        S: Patient doing well. Minimal lochia. Pain controlled. Pt voiding, ambulating and tolerating po.  PAST MEDICAL & SURGICAL HISTORY:  Asthma, exercise induced      Anxiety and depression      Attention deficit disorder (ADD) in adult      HSV infection      No significant past surgical history        O: Vital Signs Last 24 Hrs  T(C): 36.9 (28 Mar 2025 08:53), Max: 36.9 (28 Mar 2025 08:53)  T(F): 98.4 (28 Mar 2025 08:53), Max: 98.4 (28 Mar 2025 08:53)  HR: 81 (28 Mar 2025 08:53) (74 - 87)  BP: 94/57 (28 Mar 2025 08:53) (88/44 - 106/62)  BP(mean): --  RR: 16 (28 Mar 2025 08:53) (16 - 16)  SpO2: 96% (28 Mar 2025 08:53) (96% - 98%)    Parameters below as of 28 Mar 2025 08:53  Patient On (Oxygen Delivery Method): room air        Gen: NAD  Abd: soft, NT, ND, fundus firm below umbilicus  Lochia: moderate  Ext: no tenderness    Labs:                        10.4   x     )-----------( x        ( 28 Mar 2025 08:24 )             30.8

## 2025-03-28 NOTE — DISCHARGE NOTE OB - CARE PLAN
1 Principal Discharge DX:	 (normal spontaneous vaginal delivery)   Principal Discharge DX:	 (normal spontaneous vaginal delivery)  Assessment and plan of treatment:	Patient should transition to regular activity level. Resume regular diet. Patient should follow up with her OB for a postpartum checkup 3-6 weeks after delivery. Patient should call her doctor sooner if she develops a fever or uncontrolled vaginal bleeding. Please call sooner if there are any other concerns.

## 2025-03-28 NOTE — PROGRESS NOTE ADULT - ASSESSMENT
A: 30y PPD# s/p  doing well.    Plan:     A: 30y PPD# s/p  doing well.    Plan:  Vitals and h/h stable.   routine pp care.  Ambulation discussed.  Perineal care discussed.    GRISELDAE

## 2025-03-28 NOTE — DISCHARGE NOTE OB - MEDICATION SUMMARY - MEDICATIONS TO TAKE
I will START or STAY ON the medications listed below when I get home from the hospital:    ibuprofen 600 mg oral tablet  -- 1 tab(s) by mouth every 6 hours  -- Indication: For Moderate pain    acetaminophen 325 mg oral tablet  -- 3 tab(s) by mouth every 6 hours  -- Indication: For Moderate pain    valACYclovir 500 mg oral tablet  -- 1 tab(s) by mouth once a day (at bedtime)  -- Indication: For HSV-2    Prenatal Multivitamins with Folic Acid 1 mg oral tablet  -- 1 tab(s) by mouth once a day  -- Indication: For postpartum

## 2025-03-28 NOTE — DISCHARGE NOTE OB - CARE PROVIDER_API CALL
Rachel Saunders  Obstetrics and Gynecology  [address], [address]  [place] [address]  Phone: [phone]  Fax: [phone]  Established Patient  Follow Up Time:

## 2025-03-28 NOTE — DISCHARGE NOTE OB - FINANCIAL ASSISTANCE
[facility] provides services at a reduced cost to those who are determined to be eligible through [facility]’s financial assistance program. Information regarding [facility]’s financial assistance program can be found by going to [email or web address] or by calling [phone].

## 2025-03-29 VITALS
OXYGEN SATURATION: 98 % | RESPIRATION RATE: 18 BRPM | HEART RATE: 81 BPM | SYSTOLIC BLOOD PRESSURE: 96 MMHG | DIASTOLIC BLOOD PRESSURE: 66 MMHG | TEMPERATURE: 98 F

## 2025-03-29 RX ORDER — RESPIRATORY SYNCYTIAL VIRUS VACCINE 120MCG/0.5
0.5 KIT INTRAMUSCULAR ONCE
Refills: 0 | Status: DISCONTINUED | OUTPATIENT
Start: 2025-03-29 | End: 2025-03-29

## 2025-03-29 RX ADMIN — Medication 600 MILLIGRAM(S): at 00:16

## 2025-03-29 RX ADMIN — Medication 600 MILLIGRAM(S): at 07:20

## 2025-03-29 RX ADMIN — Medication 975 MILLIGRAM(S): at 10:11

## 2025-03-29 RX ADMIN — Medication 500 MILLIGRAM(S): at 00:14

## 2025-03-29 RX ADMIN — Medication 600 MILLIGRAM(S): at 01:00

## 2025-03-29 RX ADMIN — Medication 600 MILLIGRAM(S): at 06:30

## 2025-03-29 RX ADMIN — Medication 1 TABLET(S): at 10:10

## 2025-03-29 RX ADMIN — Medication 975 MILLIGRAM(S): at 03:06

## 2025-03-29 RX ADMIN — Medication 975 MILLIGRAM(S): at 04:30

## 2025-03-29 NOTE — PROGRESS NOTE ADULT - SUBJECTIVE AND OBJECTIVE BOX
Patient is a y/o GP now POD# s/p repeat  section at __w_d gestation, uncomplicated. Endorses feeling well, no complaints at this time.     S:    No acute events overnight.   The patient has no complaints.  Pain controlled with current treatment regimen.   She is ambulating without difficulty and tolerating PO.   + flatus/+ voiding   She endorses appropriate lochia.  She is breastfeeding without difficulty.   She denies fevers, chills, nausea and vomiting.   She denies lightheadedness, dizziness, palpitations, chest pain and SOB.     Vital Signs Last 24 Hrs  T(C): 36.7 (16 Sep 2024 00:13), Max: 37.2 (15 Sep 2024 16:27)  T(F): 98 (16 Sep 2024 00:13), Max: 98.9 (15 Sep 2024 16:)  HR: 60 (16 Sep 2024 00:13) (60 - 68)  BP: 117/77 (16 Sep 2024 00:13) (117/77 - 120/72)  RR: 18 (16 Sep 2024 00:13) (16 - 18)  SpO2: 99% (16 Sep 2024 00:13) (99% - 100%)    O2 Parameters below as of 16 Sep 2024 00:13  Patient On (Oxygen Delivery Method): room air      Gen: NAD, AOx3  CV: RRR, S1/S2 present  Pulm: CTAB  Breast: Nontender, non-engorged   Abdomen:  Soft, non-tender, non-distended, +bowel sounds  Incision: Clean/dry/intact with dressing in place   Uterus:  Fundus firm below umbilicus  VE:  Expectant lochia  Ext:  Non-tender and non-edematous                          10.6   13.38 )-----------( 174      ( 14 Sep 2024 08:03 )             31.1     09-14    139  |  109<H>  |  8   ----------------------------<  76  3.8   |  23  |  0.54    Ca    8.9      14 Sep 2024 08:03    TPro  5.9<L>  /  Alb  2.0<L>  /  TBili  0.5  /  DBili  x   /  AST  27  /  ALT  16  /  AlkPhos  95  -14           Patient is a 31y/o  now day#2 s/p  at 39w2d gestation, uncomplicated. Endorses feeling well, no complaints at this time. Pt reporting she didn't get RSV vaccine during pregnancy and is requesting vaccine today.     S:    No acute events overnight.   The patient has no complaints.  Pain controlled with current treatment regimen.   She is ambulating without difficulty and tolerating PO.   + flatus/+ voiding   She endorses appropriate lochia.  She is breastfeeding without difficulty.   She denies fevers, chills, nausea and vomiting.   She denies lightheadedness, dizziness, palpitations, chest pain and SOB.     Vital Signs Last 24 Hrs  T(C): 36.7 (16 Sep 2024 00:13), Max: 37.2 (15 Sep 2024 16:27)  T(F): 98 (16 Sep 2024 00:13), Max: 98.9 (15 Sep 2024 16:27)  HR: 60 (16 Sep 2024 00:13) (60 - 68)  BP: 117/77 (16 Sep 2024 00:13) (117/77 - 120/72)  RR: 18 (16 Sep 2024 00:13) (16 - 18)  SpO2: 99% (16 Sep 2024 00:13) (99% - 100%)    O2 Parameters below as of 16 Sep 2024 00:13  Patient On (Oxygen Delivery Method): room air      Gen: NAD, AOx3  CV: RRR, S1/S2 present  Pulm: CTAB  Breast: Nontender, non-engorged   Abdomen:  Soft, non-tender, non-distended, +bowel sounds  Uterus:  Fundus firm below umbilicus  VE:  Expectant lochia  Ext:  Non-tender and non-edematous                          10.6   13.38 )-----------( 174      ( 14 Sep 2024 08:03 )             31.1     09-14    139  |  109<H>  |  8   ----------------------------<  76  3.8   |  23  |  0.54    Ca    8.9      14 Sep 2024 08:03    TPro  5.9<L>  /  Alb  2.0<L>  /  TBili  0.5  /  DBili  x   /  AST  27  /  ALT  16  /  AlkPhos  95  09-14           Patient is a 31y/o  now day#2 s/p  at 39w2d gestation, uncomplicated. Endorses feeling well, no complaints at this time. Pt reporting she didn't get RSV vaccine during pregnancy and is requesting vaccine today.     S:    No acute events overnight.   The patient has no complaints.  Pain controlled with current treatment regimen.   She is ambulating without difficulty and tolerating PO.   + flatus/+ voiding   She endorses appropriate lochia.  She is breastfeeding without difficulty.   She denies fevers, chills, nausea and vomiting.   She denies lightheadedness, dizziness, palpitations, chest pain and SOB.     T(C): 36.6 (25 @ 08:29), Max: 37.2 (25 @ 00:11)  T(F): 97.9 (25 @ 08:29), Max: 98.9 (25 @ 00:11)  HR: 81 (25 @ 08:29) (64 - 81)  BP: 96/66 (25 @ 08:29) (96/66 - 108/63)  RR: 18 (25 @ 08:29) (16 - 18)  SpO2: 98% (25 @ 08:29) (98% - 99%)  Wt(kg): --      Gen: NAD, AOx3  CV: RRR, S1/S2 present  Pulm: CTAB  Breast: Nontender, non-engorged   Abdomen:  Soft, non-tender, non-distended, +bowel sounds  Uterus:  Fundus firm below umbilicus  VE:  Expectant lochia  Ext:  Non-tender and non-edematous                 LABS:  cret                        10.4   x     )-----------( x        ( 28 Mar 2025 08:24 )             30.8                            Patient is a 31y/o  now day#2 s/p  at 39w2d gestation, uncomplicated. Endorses feeling well, no complaints at this time. Pt reporting she didn't get RSV vaccine during pregnancy and is requesting vaccine today.     S:    No acute events overnight.   The patient has no complaints.  Pain controlled with current treatment regimen.   She is ambulating without difficulty and tolerating PO.   + flatus/+ voiding   She endorses appropriate lochia.  She is breastfeeding without difficulty.   She denies fevers, chills, nausea and vomiting.   She denies lightheadedness, dizziness, palpitations, chest pain and SOB.     T(C): 36.6 (25 @ 08:29), Max: 37.2 (25 @ 00:11)  T(F): 97.9 (25 @ 08:29), Max: 98.9 (25 @ 00:11)  HR: 81 (25 @ 08:29) (64 - 81)  BP: 96/66 (25 @ 08:29) (96/66 - 108/63)  RR: 18 (25 @ 08:29) (16 - 18)  SpO2: 98% (25 @ 08:29) (98% - 99%)  Wt(kg): --      Gen: NAD, AOx3  CV: RRR, S1/S2 present  Pulm: CTAB  Breast: Nontender, non-engorged   Abdomen:  Soft, non-tender, non-distended, +bowel sounds  Uterus:  Fundus firm below umbilicus  VE:  Expectant lochia  Ext:  Non-tender and non-edematous                 LABS:                          10.4   x     )-----------( x        ( 28 Mar 2025 08:24 )             30.8

## 2025-03-29 NOTE — PROGRESS NOTE ADULT - ATTENDING COMMENTS
patient voiding and ambulating with no issues, desires early discharge, stable for discharge home today

## 2025-04-01 ENCOUNTER — APPOINTMENT (OUTPATIENT)
Dept: PEDIATRICS | Facility: CLINIC | Age: 30
End: 2025-04-01

## 2025-04-02 DIAGNOSIS — O36.63X0 MATERNAL CARE FOR EXCESSIVE FETAL GROWTH, THIRD TRIMESTER, NOT APPLICABLE OR UNSPECIFIED: ICD-10-CM

## 2025-04-02 DIAGNOSIS — F98.8 OTHER SPECIFIED BEHAVIORAL AND EMOTIONAL DISORDERS WITH ONSET USUALLY OCCURRING IN CHILDHOOD AND ADOLESCENCE: ICD-10-CM

## 2025-04-02 DIAGNOSIS — Z3A.39 39 WEEKS GESTATION OF PREGNANCY: ICD-10-CM

## 2025-04-02 DIAGNOSIS — J45.909 UNSPECIFIED ASTHMA, UNCOMPLICATED: ICD-10-CM

## 2025-04-02 DIAGNOSIS — F32.A DEPRESSION, UNSPECIFIED: ICD-10-CM

## 2025-04-02 DIAGNOSIS — B00.9 HERPESVIRAL INFECTION, UNSPECIFIED: ICD-10-CM

## 2025-05-07 ENCOUNTER — APPOINTMENT (OUTPATIENT)
Dept: OBGYN | Facility: CLINIC | Age: 30
End: 2025-05-07
Payer: COMMERCIAL

## 2025-05-07 VITALS
SYSTOLIC BLOOD PRESSURE: 110 MMHG | WEIGHT: 181 LBS | HEIGHT: 69 IN | DIASTOLIC BLOOD PRESSURE: 74 MMHG | BODY MASS INDEX: 26.81 KG/M2

## 2025-05-07 DIAGNOSIS — N63.20 UNSPECIFIED LUMP IN THE LEFT BREAST, UNSPECIFIED QUADRANT: ICD-10-CM

## 2025-05-07 PROCEDURE — 0503F POSTPARTUM CARE VISIT: CPT

## 2025-05-14 ENCOUNTER — APPOINTMENT (OUTPATIENT)
Dept: ULTRASOUND IMAGING | Facility: CLINIC | Age: 30
End: 2025-05-14

## 2025-06-11 ENCOUNTER — NON-APPOINTMENT (OUTPATIENT)
Age: 30
End: 2025-06-11

## 2025-06-13 ENCOUNTER — NON-APPOINTMENT (OUTPATIENT)
Age: 30
End: 2025-06-13

## 2025-06-16 ENCOUNTER — APPOINTMENT (OUTPATIENT)
Dept: BREAST CENTER | Facility: CLINIC | Age: 30
End: 2025-06-16
Payer: COMMERCIAL

## 2025-06-16 VITALS
BODY MASS INDEX: 26.81 KG/M2 | HEART RATE: 65 BPM | DIASTOLIC BLOOD PRESSURE: 67 MMHG | WEIGHT: 181 LBS | HEIGHT: 69 IN | SYSTOLIC BLOOD PRESSURE: 104 MMHG

## 2025-06-16 PROCEDURE — 99214 OFFICE O/P EST MOD 30 MIN: CPT

## 2025-06-16 RX ORDER — DICLOXACILLIN SODIUM 500 MG/1
500 CAPSULE ORAL 4 TIMES DAILY
Qty: 28 | Refills: 0 | Status: ACTIVE | COMMUNITY
Start: 2025-06-16 | End: 1900-01-01

## 2025-06-26 RX ORDER — CEFADROXIL 500 MG/1
500 CAPSULE ORAL
Qty: 14 | Refills: 0 | Status: ACTIVE | COMMUNITY
Start: 2025-06-26 | End: 1900-01-01

## 2025-07-03 ENCOUNTER — APPOINTMENT (OUTPATIENT)
Dept: BREAST CENTER | Facility: CLINIC | Age: 30
End: 2025-07-03
Payer: COMMERCIAL

## 2025-07-03 PROBLEM — L53.9 BREAST ERYTHEMA: Status: ACTIVE | Noted: 2025-06-16

## 2025-07-03 PROCEDURE — 99214 OFFICE O/P EST MOD 30 MIN: CPT

## 2025-07-03 RX ORDER — CEFADROXIL 500 MG/1
500 CAPSULE ORAL
Qty: 10 | Refills: 0 | Status: ACTIVE | COMMUNITY
Start: 2025-07-03 | End: 1900-01-01

## 2025-07-07 ENCOUNTER — NON-APPOINTMENT (OUTPATIENT)
Age: 30
End: 2025-07-07

## 2025-07-07 ENCOUNTER — APPOINTMENT (OUTPATIENT)
Dept: BREAST CENTER | Facility: CLINIC | Age: 30
End: 2025-07-07
Payer: COMMERCIAL

## 2025-07-07 VITALS
SYSTOLIC BLOOD PRESSURE: 108 MMHG | WEIGHT: 181 LBS | HEIGHT: 69 IN | HEART RATE: 78 BPM | DIASTOLIC BLOOD PRESSURE: 69 MMHG | BODY MASS INDEX: 26.81 KG/M2

## 2025-07-07 PROCEDURE — 99214 OFFICE O/P EST MOD 30 MIN: CPT

## 2025-07-08 PROBLEM — R10.2 PELVIC PAIN IN FEMALE: Status: ACTIVE | Noted: 2025-07-08

## 2025-07-08 RX ORDER — BROMOCRIPTINE MESYLATE 2.5 MG/1
2.5 TABLET ORAL DAILY
Qty: 30 | Refills: 0 | Status: ACTIVE | COMMUNITY
Start: 2025-07-08 | End: 1900-01-01

## 2025-07-16 ENCOUNTER — APPOINTMENT (OUTPATIENT)
Dept: OBGYN | Facility: CLINIC | Age: 30
End: 2025-07-16

## 2025-07-22 ENCOUNTER — APPOINTMENT (OUTPATIENT)
Dept: BREAST CENTER | Facility: CLINIC | Age: 30
End: 2025-07-22
Payer: COMMERCIAL

## 2025-07-22 VITALS — HEART RATE: 66 BPM | SYSTOLIC BLOOD PRESSURE: 96 MMHG | DIASTOLIC BLOOD PRESSURE: 68 MMHG

## 2025-07-22 DIAGNOSIS — L98.8 OTHER SPECIFIED DISORDERS OF THE SKIN AND SUBCUTANEOUS TISSUE: ICD-10-CM

## 2025-07-22 DIAGNOSIS — N63.20 UNSPECIFIED LUMP IN THE LEFT BREAST, UNSPECIFIED QUADRANT: ICD-10-CM

## 2025-07-22 DIAGNOSIS — R92.8 OTHER ABNORMAL AND INCONCLUSIVE FINDINGS ON DIAGNOSTIC IMAGING OF BREAST: ICD-10-CM

## 2025-07-22 PROCEDURE — 99214 OFFICE O/P EST MOD 30 MIN: CPT

## 2025-07-25 ENCOUNTER — NON-APPOINTMENT (OUTPATIENT)
Age: 30
End: 2025-07-25

## 2025-07-25 ENCOUNTER — APPOINTMENT (OUTPATIENT)
Dept: OBGYN | Facility: CLINIC | Age: 30
End: 2025-07-25
Payer: COMMERCIAL

## 2025-07-25 VITALS
HEIGHT: 67 IN | WEIGHT: 180 LBS | DIASTOLIC BLOOD PRESSURE: 64 MMHG | SYSTOLIC BLOOD PRESSURE: 102 MMHG | BODY MASS INDEX: 28.25 KG/M2

## 2025-07-25 DIAGNOSIS — Z01.419 ENCOUNTER FOR GYNECOLOGICAL EXAMINATION (GENERAL) (ROUTINE) W/OUT ABNORMAL FINDINGS: ICD-10-CM

## 2025-07-25 PROCEDURE — 99395 PREV VISIT EST AGE 18-39: CPT

## 2025-07-30 LAB — CYTOLOGY CVX/VAG DOC THIN PREP: NORMAL
